# Patient Record
Sex: FEMALE | Race: WHITE | NOT HISPANIC OR LATINO
[De-identification: names, ages, dates, MRNs, and addresses within clinical notes are randomized per-mention and may not be internally consistent; named-entity substitution may affect disease eponyms.]

---

## 2017-02-11 ENCOUNTER — APPOINTMENT (OUTPATIENT)
Dept: OBGYN | Facility: CLINIC | Age: 27
End: 2017-02-11

## 2017-02-11 VITALS
HEIGHT: 67 IN | SYSTOLIC BLOOD PRESSURE: 108 MMHG | BODY MASS INDEX: 24.17 KG/M2 | DIASTOLIC BLOOD PRESSURE: 68 MMHG | WEIGHT: 154 LBS

## 2017-02-11 DIAGNOSIS — N94.6 DYSMENORRHEA, UNSPECIFIED: ICD-10-CM

## 2017-02-11 DIAGNOSIS — N92.6 IRREGULAR MENSTRUATION, UNSPECIFIED: ICD-10-CM

## 2017-02-11 DIAGNOSIS — Z01.419 ENCOUNTER FOR GYNECOLOGICAL EXAMINATION (GENERAL) (ROUTINE) W/OUT ABNORMAL FINDINGS: ICD-10-CM

## 2017-02-13 LAB
C TRACH DNA SPEC QL NAA+PROBE: NORMAL
C TRACH RRNA SPEC QL NAA+PROBE: NORMAL
N GONORRHOEA DNA SPEC QL NAA+PROBE: NORMAL
N GONORRHOEA RRNA SPEC QL NAA+PROBE: NORMAL
SOURCE AMPLIFICATION: NORMAL
SOURCE TP AMPLIFICATION: NORMAL
T VAGINALIS RRNA SPEC QL NAA+PROBE: NEGATIVE

## 2017-02-22 LAB — CYTOLOGY CVX/VAG DOC THIN PREP: NORMAL

## 2017-05-22 ENCOUNTER — RX RENEWAL (OUTPATIENT)
Age: 27
End: 2017-05-22

## 2017-08-07 ENCOUNTER — MEDICATION RENEWAL (OUTPATIENT)
Age: 27
End: 2017-08-07

## 2017-08-07 ENCOUNTER — RX RENEWAL (OUTPATIENT)
Age: 27
End: 2017-08-07

## 2017-09-01 ENCOUNTER — APPOINTMENT (OUTPATIENT)
Dept: OBGYN | Facility: CLINIC | Age: 27
End: 2017-09-01

## 2018-01-03 ENCOUNTER — LABORATORY RESULT (OUTPATIENT)
Age: 28
End: 2018-01-03

## 2018-01-03 ENCOUNTER — APPOINTMENT (OUTPATIENT)
Dept: RHEUMATOLOGY | Facility: CLINIC | Age: 28
End: 2018-01-03
Payer: COMMERCIAL

## 2018-01-03 VITALS
HEART RATE: 64 BPM | DIASTOLIC BLOOD PRESSURE: 54 MMHG | OXYGEN SATURATION: 98 % | SYSTOLIC BLOOD PRESSURE: 80 MMHG | HEIGHT: 67 IN

## 2018-01-03 DIAGNOSIS — R20.2 PARESTHESIA OF SKIN: ICD-10-CM

## 2018-01-03 PROCEDURE — 99204 OFFICE O/P NEW MOD 45 MIN: CPT

## 2018-01-03 RX ORDER — GABAPENTIN 300 MG/1
300 CAPSULE ORAL 3 TIMES DAILY
Refills: 0 | Status: ACTIVE | COMMUNITY
Start: 2018-01-03

## 2018-01-03 RX ORDER — TIZANIDINE 2 MG/1
2 TABLET ORAL
Qty: 60 | Refills: 3 | Status: ACTIVE | COMMUNITY
Start: 2018-01-03 | End: 1900-01-01

## 2018-01-08 LAB
25(OH)D3 SERPL-MCNC: 49 NG/ML
ALBUMIN SERPL ELPH-MCNC: 4.8 G/DL
ALP BLD-CCNC: 44 U/L
ALT SERPL-CCNC: 12 U/L
ANA SER IF-ACNC: NEGATIVE
ANION GAP SERPL CALC-SCNC: 17 MMOL/L
AST SERPL-CCNC: 24 U/L
BASOPHILS # BLD AUTO: 0.02 K/UL
BASOPHILS NFR BLD AUTO: 0.3 %
BILIRUB SERPL-MCNC: 0.5 MG/DL
BUN SERPL-MCNC: 17 MG/DL
C3 SERPL-MCNC: 121 MG/DL
C4 SERPL-MCNC: 24 MG/DL
CALCIUM SERPL-MCNC: 9.8 MG/DL
CARDIOLIPIN IGM SER-MCNC: <5 GPL
CARDIOLIPIN IGM SER-MCNC: <5 MPL
CCP AB SER IA-ACNC: <8 UNITS
CHLORIDE SERPL-SCNC: 101 MMOL/L
CK SERPL-CCNC: 119 U/L
CO2 SERPL-SCNC: 23 MMOL/L
CREAT SERPL-MCNC: 0.82 MG/DL
CREAT SPEC-SCNC: 16 MG/DL
CREAT/PROT UR: NORMAL
CRP SERPL-MCNC: 0.4 MG/DL
DSDNA AB SER-ACNC: <12 IU/ML
ENA RNP AB SER IA-ACNC: <0.2 AL
ENA SM AB SER IA-ACNC: <0.2 AL
ENA SS-A AB SER IA-ACNC: <0.2 AL
ENA SS-B AB SER IA-ACNC: <0.2 AL
ENDOMYSIUM IGA SER QL: NEGATIVE
ENDOMYSIUM IGA TITR SER: NORMAL
EOSINOPHIL # BLD AUTO: 0.03 K/UL
EOSINOPHIL NFR BLD AUTO: 0.5
ERYTHROCYTE [SEDIMENTATION RATE] IN BLOOD BY WESTERGREN METHOD: 13 MM/HR
GLIADIN IGA SER QL: 5.3 UNITS
GLIADIN IGG SER QL: <5 UNITS
GLIADIN PEPTIDE IGA SER-ACNC: NEGATIVE
GLIADIN PEPTIDE IGG SER-ACNC: NEGATIVE
GLUCOSE SERPL-MCNC: 109 MG/DL
HAV IGM SER QL: NONREACTIVE
HBV CORE IGM SER QL: NONREACTIVE
HBV SURFACE AG SER QL: NONREACTIVE
HCT VFR BLD CALC: 41.6 %
HCV AB SER QL: NONREACTIVE
HCV S/CO RATIO: 0.16 S/CO
HGB BLD-MCNC: 13.5 G/DL
IMM GRANULOCYTES NFR BLD AUTO: 0.3 %
LYMPHOCYTES # BLD AUTO: 1.72 K/UL
LYMPHOCYTES NFR BLD AUTO: 29.3 %
MAN DIFF?: NORMAL
MCHC RBC-ENTMCNC: 30.3 PG
MCHC RBC-ENTMCNC: 32.5 GM/DL
MCV RBC AUTO: 93.3 FL
MONOCYTES # BLD AUTO: 0.35 K/UL
MONOCYTES NFR BLD AUTO: 6 %
MPO AB + PR3 PNL SER: NORMAL
NEUTROPHILS # BLD AUTO: 3.74 K/UL
NEUTROPHILS NFR BLD AUTO: 63.6 %
PLATELET # BLD AUTO: 200 K/UL
POTASSIUM SERPL-SCNC: 4.2 MMOL/L
PROT SERPL-MCNC: 7.8 G/DL
PROT UR-MCNC: <4 MG/DL
RBC # BLD: 4.46 M/UL
RBC # FLD: 12.9 %
RF+CCP IGG SER-IMP: NEGATIVE
RHEUMATOID FACT SER QL: 9 IU/ML
SODIUM SERPL-SCNC: 141 MMOL/L
THYROGLOB AB SERPL-ACNC: <20 IU/ML
THYROPEROXIDASE AB SERPL IA-ACNC: 15.9 IU/ML
TSH SERPL-ACNC: 1.13 UIU/ML
TTG IGA SER IA-ACNC: <5 UNITS
TTG IGA SER-ACNC: NEGATIVE
TTG IGG SER IA-ACNC: <5 UNITS
TTG IGG SER IA-ACNC: NEGATIVE
WBC # FLD AUTO: 5.88 K/UL

## 2018-01-09 LAB — HISTONE AB SER QL: 0.7 UNITS

## 2018-02-16 ENCOUNTER — EMERGENCY (EMERGENCY)
Facility: HOSPITAL | Age: 28
LOS: 0 days | Discharge: HOME | End: 2018-02-16
Attending: EMERGENCY MEDICINE | Admitting: EMERGENCY MEDICINE
Payer: COMMERCIAL

## 2018-02-16 VITALS
RESPIRATION RATE: 18 BRPM | HEIGHT: 66 IN | DIASTOLIC BLOOD PRESSURE: 76 MMHG | TEMPERATURE: 99 F | SYSTOLIC BLOOD PRESSURE: 118 MMHG | WEIGHT: 160.06 LBS | OXYGEN SATURATION: 100 % | HEART RATE: 76 BPM

## 2018-02-16 DIAGNOSIS — Z87.312 PERSONAL HISTORY OF (HEALED) STRESS FRACTURE: Chronic | ICD-10-CM

## 2018-02-16 DIAGNOSIS — R11.0 NAUSEA: ICD-10-CM

## 2018-02-16 DIAGNOSIS — J11.1 INFLUENZA DUE TO UNIDENTIFIED INFLUENZA VIRUS WITH OTHER RESPIRATORY MANIFESTATIONS: ICD-10-CM

## 2018-02-16 PROCEDURE — 71046 X-RAY EXAM CHEST 2 VIEWS: CPT | Mod: 26

## 2018-02-16 PROCEDURE — 93010 ELECTROCARDIOGRAM REPORT: CPT

## 2018-02-16 PROCEDURE — 99283 EMERGENCY DEPT VISIT LOW MDM: CPT

## 2018-02-16 RX ORDER — ONDANSETRON 8 MG/1
4 TABLET, FILM COATED ORAL ONCE
Qty: 0 | Refills: 0 | Status: COMPLETED | OUTPATIENT
Start: 2018-02-16 | End: 2018-02-16

## 2018-02-16 RX ORDER — ONDANSETRON 8 MG/1
1 TABLET, FILM COATED ORAL
Qty: 12 | Refills: 0 | OUTPATIENT
Start: 2018-02-16

## 2018-02-16 RX ADMIN — ONDANSETRON 4 MILLIGRAM(S): 8 TABLET, FILM COATED ORAL at 14:33

## 2018-02-16 NOTE — ED STATDOCS - PROGRESS NOTE DETAILS
Patient seen and evaluated.  CXR with NAD.  Patient reporting nausea s/p tamiflu.  Will give zofran.  REviewed symptom care, PMD f/u and return precautions -Yimi Unger PA-C

## 2018-02-16 NOTE — ED STATDOCS - ATTENDING CONTRIBUTION TO CARE
I, Demian Smith MD,  performed the initial face to face bedside interview with this patient regarding history of present illness, review of symptoms and relevant past medical, social and family history.  I completed an independent physical examination.  I was the initial provider who evaluated this patient. I have signed out the follow up of any pending tests (i.e. labs, radiological studies) to the ACP.  I have communicated the patient’s plan of care and disposition with the ACP.  The history, relevant review of systems, past medical and surgical history, medical decision making, and physical examination was documented by the scribe in my presence and I attest to the accuracy of the documentation.  I, Demian Smith MD, personally saw the patient with ACP.  I have personally performed a face to face diagnostic evaluation on this patient.  I have reviewed the ACP note and agree with the history, exam, and plan of care, except as noted.

## 2018-02-16 NOTE — ED ADULT NURSE NOTE - CHPI ED SYMPTOMS NEG
no diarrhea/no chills/no fever/no hematuria/no blood in stool/no burning urination/no dysuria/no abdominal distension

## 2018-02-16 NOTE — ED ADULT NURSE NOTE - CHIEF COMPLAINT QUOTE
VACCINE ADMINISTRATION RECORD  PARENT / GUARDIAN APPROVAL  Date: 2021  Vaccine administered to:  Keo Fernandez     : 3/19/2018    MRN: [id number]    A copy of the appropriate Centers for Disease Control and Prevention Vaccine Information statemen pt presents with N/V and chills times three days. Pt has existing stress fx in right leg

## 2018-02-16 NOTE — ED ADULT NURSE NOTE - OBJECTIVE STATEMENT
pt brought by parent for eval of nausea and URI for the past 3 days. states taken Tamiflu w/o relief in sx.

## 2018-02-16 NOTE — ED STATDOCS - OBJECTIVE STATEMENT
28F, presents to ED c/o flu-like symptoms (N/V, myalgias, chills, cough) for 3 days. +abd pain. States going to Urgent Care center and being given Tamiflu. Pt has no other complaints and denies CP, and HA. Pt has existing stress fx in right leg (pt is in wheelchair).

## 2018-02-21 ENCOUNTER — APPOINTMENT (OUTPATIENT)
Dept: INTERNAL MEDICINE | Facility: CLINIC | Age: 28
End: 2018-02-21
Payer: COMMERCIAL

## 2018-02-21 VITALS
BODY MASS INDEX: 25.9 KG/M2 | OXYGEN SATURATION: 98 % | DIASTOLIC BLOOD PRESSURE: 80 MMHG | WEIGHT: 165 LBS | TEMPERATURE: 97.4 F | HEART RATE: 88 BPM | HEIGHT: 67 IN | SYSTOLIC BLOOD PRESSURE: 122 MMHG | RESPIRATION RATE: 18 BRPM

## 2018-02-21 DIAGNOSIS — M84.359A STRESS FRACTURE, HIP, UNSPECIFIED, INITIAL ENCOUNTER FOR FRACTURE: ICD-10-CM

## 2018-02-21 DIAGNOSIS — Z87.09 PERSONAL HISTORY OF OTHER DISEASES OF THE RESPIRATORY SYSTEM: ICD-10-CM

## 2018-02-21 PROCEDURE — 99213 OFFICE O/P EST LOW 20 MIN: CPT

## 2020-12-16 PROBLEM — Z87.09 HISTORY OF INFLUENZA: Status: RESOLVED | Noted: 2018-02-21 | Resolved: 2020-12-16

## 2021-04-28 ENCOUNTER — NON-APPOINTMENT (OUTPATIENT)
Age: 31
End: 2021-04-28

## 2021-04-28 ENCOUNTER — APPOINTMENT (OUTPATIENT)
Dept: RHEUMATOLOGY | Facility: CLINIC | Age: 31
End: 2021-04-28
Payer: COMMERCIAL

## 2021-04-28 VITALS
TEMPERATURE: 97.7 F | HEIGHT: 65 IN | HEART RATE: 67 BPM | SYSTOLIC BLOOD PRESSURE: 117 MMHG | WEIGHT: 167 LBS | OXYGEN SATURATION: 97 % | BODY MASS INDEX: 27.82 KG/M2 | DIASTOLIC BLOOD PRESSURE: 77 MMHG

## 2021-04-28 DIAGNOSIS — M54.5 LOW BACK PAIN: ICD-10-CM

## 2021-04-28 DIAGNOSIS — G62.9 POLYNEUROPATHY, UNSPECIFIED: ICD-10-CM

## 2021-04-28 DIAGNOSIS — R07.89 OTHER CHEST PAIN: ICD-10-CM

## 2021-04-28 DIAGNOSIS — M79.18 MYALGIA, OTHER SITE: ICD-10-CM

## 2021-04-28 DIAGNOSIS — R20.0 ANESTHESIA OF SKIN: ICD-10-CM

## 2021-04-28 DIAGNOSIS — Z82.61 FAMILY HISTORY OF ARTHRITIS: ICD-10-CM

## 2021-04-28 DIAGNOSIS — M25.50 PAIN IN UNSPECIFIED JOINT: ICD-10-CM

## 2021-04-28 DIAGNOSIS — R79.89 OTHER SPECIFIED ABNORMAL FINDINGS OF BLOOD CHEMISTRY: ICD-10-CM

## 2021-04-28 DIAGNOSIS — R20.2 ANESTHESIA OF SKIN: ICD-10-CM

## 2021-04-28 PROCEDURE — 99072 ADDL SUPL MATRL&STAF TM PHE: CPT

## 2021-04-28 PROCEDURE — 99205 OFFICE O/P NEW HI 60 MIN: CPT

## 2021-04-28 RX ORDER — MELOXICAM 15 MG/1
TABLET ORAL
Refills: 0 | Status: ACTIVE | COMMUNITY

## 2021-04-28 RX ORDER — GABAPENTIN 100 MG
100 TABLET ORAL
Refills: 0 | Status: ACTIVE | COMMUNITY

## 2021-04-28 RX ORDER — TIZANIDINE 4 MG/1
4 TABLET ORAL
Refills: 0 | Status: ACTIVE | COMMUNITY

## 2021-04-28 RX ORDER — NORTRIPTYLINE HYDROCHLORIDE 25 MG/1
25 CAPSULE ORAL
Refills: 0 | Status: ACTIVE | COMMUNITY

## 2021-04-28 NOTE — HISTORY OF PRESENT ILLNESS
[FreeTextEntry1] : 31-year-old female here for the first time. Patient states she was found to have a mildly high ACE= 91 with her physician and told to rule out sarcoid.\par Patient denies any swelling or redness or warmth of any joints. She denies any pain in the ankles. Denies any SOB or cough at this time. \par She denies any erythema nodosum nodules. Denies any skin lesion or rashes.\par She states she has history of costochondritis as well as myofascial pain syndrome that she monitors with pain management for many years. States she does take gabapentin t.i.d.\par States she has history of numbness/tingling intermittently. States she was diagnosed with small fiber neuropathy. Denies any dry eyes or dry mouth.\par States she notices some generalized tender points for fibromyalgia.\par States tizanidine p.r.n. helps but makes her drowsy so takes it intermittently. States she is on medical marijuana with pain management. Denies any illicit drug use.\par States she notices some lower back pain with standing; better with stretching. Denies any loss of bladder or bowel incontinence or saddle anesthesias.\par Denies any fever/chills, no rashes other than eczema at times, no ulcers, no dry eyes, no dry mouth, no raynaud's, no infectious diarrhea or  symptoms at this time.\par

## 2021-04-28 NOTE — PHYSICAL EXAM
[General Appearance - Alert] : alert [General Appearance - In No Acute Distress] : in no acute distress [Sclera] : the sclera and conjunctiva were normal [Extraocular Movements] : extraocular movements were intact [Outer Ear] : the ears and nose were normal in appearance [Neck Appearance] : the appearance of the neck was normal [Respiration, Rhythm And Depth] : normal respiratory rhythm and effort [Heart Rate And Rhythm] : heart rate was normal and rhythm regular [Heart Sounds] : normal S1 and S2 [Abdomen Soft] : soft [Abdomen Tenderness] : non-tender [Cervical Lymph Nodes Enlarged Anterior Bilaterally] : anterior cervical [Supraclavicular Lymph Nodes Enlarged Bilaterally] : supraclavicular [No CVA Tenderness] : no ~M costovertebral angle tenderness [Motor Tone] : muscle strength and tone were normal [] : no rash [No Focal Deficits] : no focal deficits [Impaired Insight] : insight and judgment were intact [Mood] : the mood was normal [FreeTextEntry1] : no synovitis or effusion on exam noted today; + multiple tender points incld sternum; costochondral; upper/lower back;elbows;hips; medial knee,etc

## 2021-04-28 NOTE — ASSESSMENT
[FreeTextEntry1] : 31-year-old female, here for the first time w/ me w/ generalized aches/pain w/ + tender points of Fibromyaglia incld costochondral pain on palpation & LBP to rule out inflammatory arthropathy incld seronegative spondyloarthropathy. \par Reports was told to r/o sarcoid by another physician for mildly high ACE= 91 on labs 12/2020. She denies any joint pain or swelling with no ankle pain at all at this time; no erythema nodosum nodules at this time; no SOB or cough. Knows to see Optho for eye exam to confirm normal. \par -reviewed labs 12/9/2020 from Dr. Cha w/ raised ACE= 91 (Nl up to 82); RO/LA neg; celiac pane neg\par -chart reviewed w/ labs 1/3/2018 w/ Rheum, Dr. Benitez all normal incld JERRELL w/ IF neg; ANCA neg; RF/CCP neg; RO/LA neg; ESR/CRP NL, etc\par -No synovitis or effusion on exam noted today and advised to monitor.\par -labs as below incld ESR, CRP, serologies to be complete\par -referred for chest xray to confirm no hilar adenopathy of sarcoid \par \par LBP: intermittent \par -Referred for xray of LS spine to evaluate for structural changes, DDD, confirm SI normal \par -Meloxicam PRN w/ food needed sparingly helps\par \par Cervicalgia: reviewed MRI c-spine 12/31/2020 w/ C5-C6 disc herniation \par \par Intermittent numbness/tingling: intermittent in hands Advised to monitor for CTS symptoms in the hands.\par -check metabolic labs incld folate, B12, TSH, HbA1C for it.\par -did EMG she reports and hx of small fiber neuropathy so will check Sjogren abs; denies sicca symptoms \par \par Fibromyalgia: + tender points present \par -continue gabapentin 300mg PO TID \par -encouraged gentle exercises as tolerated\par \par Myofascial pain syndrome: sees pain management, Dr. Viviana Fernandez \par -trigger point injections did not help\par -takes gabapentin, neurtriptyline, tizanidine pRN; medical marijuana and meloxicam PRN \par \par -educated on symptoms to monitor for in detail and alert us if any concerns.\par -knows to stay up to date on health maintenance w/ PCP\par -f/u in 10-14days w/ labs, xrays please\par

## 2021-05-10 LAB
25(OH)D3 SERPL-MCNC: 47.4 NG/ML
ACE BLD-CCNC: 78 U/L
ALBUMIN SERPL ELPH-MCNC: 4.8 G/DL
ALP BLD-CCNC: 46 U/L
ALT SERPL-CCNC: 14 U/L
ANION GAP SERPL CALC-SCNC: 14 MMOL/L
AST SERPL-CCNC: 24 U/L
BASOPHILS # BLD AUTO: 0.04 K/UL
BASOPHILS NFR BLD AUTO: 1 %
BILIRUB SERPL-MCNC: 0.4 MG/DL
BUN SERPL-MCNC: 12 MG/DL
CALCIUM SERPL-MCNC: 9.5 MG/DL
CHLORIDE SERPL-SCNC: 105 MMOL/L
CK SERPL-CCNC: 151 U/L
CO2 SERPL-SCNC: 22 MMOL/L
CREAT SERPL-MCNC: 0.91 MG/DL
CRP SERPL-MCNC: 3 MG/L
EOSINOPHIL # BLD AUTO: 0.1 K/UL
EOSINOPHIL NFR BLD AUTO: 2.5 %
ERYTHROCYTE [SEDIMENTATION RATE] IN BLOOD BY WESTERGREN METHOD: 8 MM/HR
ESTIMATED AVERAGE GLUCOSE: 94 MG/DL
FOLATE SERPL-MCNC: 16.9 NG/ML
GLUCOSE SERPL-MCNC: 76 MG/DL
HBA1C MFR BLD HPLC: 4.9 %
HCT VFR BLD CALC: 42.2 %
HGB BLD-MCNC: 13.6 G/DL
IMM GRANULOCYTES NFR BLD AUTO: 0.2 %
LYMPHOCYTES # BLD AUTO: 2.04 K/UL
LYMPHOCYTES NFR BLD AUTO: 50.1 %
MAN DIFF?: NORMAL
MCHC RBC-ENTMCNC: 30.6 PG
MCHC RBC-ENTMCNC: 32.2 GM/DL
MCV RBC AUTO: 94.8 FL
MONOCYTES # BLD AUTO: 0.36 K/UL
MONOCYTES NFR BLD AUTO: 8.8 %
NEUTROPHILS # BLD AUTO: 1.52 K/UL
NEUTROPHILS NFR BLD AUTO: 37.4 %
PLATELET # BLD AUTO: 186 K/UL
POTASSIUM SERPL-SCNC: 4.9 MMOL/L
PROT SERPL-MCNC: 7.3 G/DL
RBC # BLD: 4.45 M/UL
RBC # FLD: 12.9 %
SODIUM SERPL-SCNC: 140 MMOL/L
TSH SERPL-ACNC: 2.32 UIU/ML
VIT B12 SERPL-MCNC: 344 PG/ML
WBC # FLD AUTO: 4.07 K/UL

## 2021-05-12 LAB
B BURGDOR AB SER-IMP: NEGATIVE
B BURGDOR IGM PATRN SER IB-IMP: NEGATIVE
B BURGDOR18KD IGG SER QL IB: NORMAL
B BURGDOR23KD IGG SER QL IB: NORMAL
B BURGDOR23KD IGM SER QL IB: NORMAL
B BURGDOR28KD IGG SER QL IB: NORMAL
B BURGDOR30KD IGG SER QL IB: NORMAL
B BURGDOR31KD IGG SER QL IB: NORMAL
B BURGDOR39KD IGG SER QL IB: NORMAL
B BURGDOR39KD IGM SER QL IB: NORMAL
B BURGDOR41KD IGG SER QL IB: PRESENT
B BURGDOR41KD IGM SER QL IB: NORMAL
B BURGDOR45KD IGG SER QL IB: NORMAL
B BURGDOR58KD IGG SER QL IB: NORMAL
B BURGDOR66KD IGG SER QL IB: NORMAL
B BURGDOR93KD IGG SER QL IB: NORMAL
ENA SS-A AB SER IA-ACNC: <0.2 AL
ENA SS-B AB SER IA-ACNC: <0.2 AL
G6PD SER-CCNC: 14.8 U/G HGB
HLA-B27 RELATED AG QL: NEGATIVE

## 2021-05-17 LAB
B19V IGG SER QL IA: 9.43 INDEX
B19V IGG+IGM SER-IMP: NORMAL
B19V IGG+IGM SER-IMP: POSITIVE
B19V IGM FLD-ACNC: 0.17 INDEX
B19V IGM SER-ACNC: NEGATIVE

## 2021-06-09 ENCOUNTER — APPOINTMENT (OUTPATIENT)
Dept: RHEUMATOLOGY | Facility: CLINIC | Age: 31
End: 2021-06-09
Payer: COMMERCIAL

## 2021-06-09 VITALS
WEIGHT: 160 LBS | DIASTOLIC BLOOD PRESSURE: 72 MMHG | BODY MASS INDEX: 26.63 KG/M2 | HEART RATE: 68 BPM | SYSTOLIC BLOOD PRESSURE: 128 MMHG | OXYGEN SATURATION: 98 % | TEMPERATURE: 97.1 F

## 2021-06-09 DIAGNOSIS — M50.20 OTHER CERVICAL DISC DISPLACEMENT, UNSPECIFIED CERVICAL REGION: ICD-10-CM

## 2021-06-09 DIAGNOSIS — E53.8 DEFICIENCY OF OTHER SPECIFIED B GROUP VITAMINS: ICD-10-CM

## 2021-06-09 DIAGNOSIS — M79.7 FIBROMYALGIA: ICD-10-CM

## 2021-06-09 PROCEDURE — 99214 OFFICE O/P EST MOD 30 MIN: CPT

## 2021-06-09 RX ORDER — CYANOCOBALAMIN (VITAMIN B-12) 2500 MCG
2500 TABLET, SUBLINGUAL SUBLINGUAL
Qty: 30 | Refills: 1 | Status: ACTIVE | COMMUNITY
Start: 2021-06-09 | End: 1900-01-01

## 2021-06-09 NOTE — REASON FOR VISIT
[Follow-Up: _____] : a [unfilled] follow-up visit [FreeTextEntry1] : Fibromyalgia; hx of raised ACE; review labs/xrays/med

## 2021-06-09 NOTE — ASSESSMENT
[FreeTextEntry1] : 31-year-old female, here for the first time w/ me w/ generalized aches/pain w/ + tender points of Fibromyaglia incld costochondral pain on palpation & LBP to rule out inflammatory arthropathy incld seronegative spondyloarthropathy. \par Reports was told to r/o sarcoid by another physician for mildly high ACE= 91 on labs 12/2020 w/ repeat ACE normal here and chest xray normal without hilar adenopathy. She denies any joint pain or swelling with no ankle pain at all at this time; no erythema nodosum nodules at this time; no SOB or cough. Reports eye exam w/ Optho normal within the yr and monitors there.\par -reviewed labs 4/28/2021 w/ normal ESR/CRP; ACE normal; low B12= 344; Nl CPK; etc\par -reviewed chest xray 5/10/2021 normal.\par -reviewed labs 12/9/2020 from Dr. Cha w/ raised ACE= 91 (Nl up to 82); RO/LA neg; celiac pane neg\par -chart reviewed w/ labs 1/3/2018 w/ Rheum, Dr. Benitez all normal incld JERRELL w/ IF neg; ANCA neg; RF/CCP neg; RO/LA neg; ESR/CRP NL, etc\par -No synovitis or effusion on exam noted today and advised to monitor.\par \par LBP: intermittent; HLA-B27 negative \par -Reviewed xray LS spine 5/10/2021 normal; SI normal \par -Meloxicam PRN w/ food needed sparingly helps\par \par Cervicalgia: reviewed MRI c-spine 12/31/2020 w/ C5-C6 disc herniation \par \par Intermittent numbness/tingling: intermittent \par -reviewed metabolic labs 4/28/2021 w/ normal folate,  low B12 <400, NL TSH, Nl HbA1C for it.\par -did EMG she reports and hx of small fiber neuropathy so will check Sjogren abs; denies sicca symptoms \par \par Low B12 <400 on labs\par -reports does not eat much red meat\par -discussed r/b/s of B12 2500mcg PO daily w/ pt agreeable and prescription sent as below; discussed to monitor levels w/ PCP and if still low she can consider IM injections w/ her PCP\par \par Fibromyalgia: + tender points present \par -continue gabapentin 300mg PO TID w/ pain management \par -encouraged gentle exercises as tolerated\par -PT referral given for medical massage if covered by insurance \par \par Myofascial pain syndrome: sees pain management, Dr. Viviana Fernandez \par -trigger point injections did not help\par -takes gabapentin, neurtriptyline, tizanidine pRN; medical marijuana and meloxicam PRN \par \par -educated on symptoms to monitor for in detail and alert us if any concerns.\par -knows to stay up to date on health maintenance w/ PCP\par -f/u in 1 yr PRN or sooner as needed \par . \par

## 2021-06-09 NOTE — PHYSICAL EXAM
[General Appearance - Alert] : alert [General Appearance - In No Acute Distress] : in no acute distress [Sclera] : the sclera and conjunctiva were normal [Extraocular Movements] : extraocular movements were intact [Outer Ear] : the ears and nose were normal in appearance [Neck Appearance] : the appearance of the neck was normal [Respiration, Rhythm And Depth] : normal respiratory rhythm and effort [Heart Rate And Rhythm] : heart rate was normal and rhythm regular [Heart Sounds] : normal S1 and S2 [Abdomen Soft] : soft [Abdomen Tenderness] : non-tender [Cervical Lymph Nodes Enlarged Anterior Bilaterally] : anterior cervical [Supraclavicular Lymph Nodes Enlarged Bilaterally] : supraclavicular [No CVA Tenderness] : no ~M costovertebral angle tenderness [Motor Tone] : muscle strength and tone were normal [] : no rash [No Focal Deficits] : no focal deficits [Impaired Insight] : insight and judgment were intact [Mood] : the mood was normal [FreeTextEntry1] : no synovitis or effusion on exam noted today; + multiple tender points incld sternum; upper/lower back;elbows;hips; medial knee,etc

## 2022-08-01 ENCOUNTER — OFFICE (OUTPATIENT)
Dept: URBAN - METROPOLITAN AREA CLINIC 102 | Facility: CLINIC | Age: 32
Setting detail: OPHTHALMOLOGY
End: 2022-08-01
Payer: COMMERCIAL

## 2022-08-01 DIAGNOSIS — H16.223: ICD-10-CM

## 2022-08-01 PROCEDURE — 92004 COMPRE OPH EXAM NEW PT 1/>: CPT | Performed by: OPHTHALMOLOGY

## 2022-08-01 ASSESSMENT — VISUAL ACUITY
OS_BCVA: 20/20
OD_BCVA: 20/20

## 2022-08-01 ASSESSMENT — REFRACTION_AUTOREFRACTION
OD_SPHERE: -1.25
OS_SPHERE: +0.50
OS_CYLINDER: -0.25
OD_CYLINDER: -0.50
OS_AXIS: 078
OD_AXIS: 112

## 2022-08-01 ASSESSMENT — KERATOMETRY
OD_K2POWER_DIOPTERS: 47.50
OS_K1POWER_DIOPTERS: 46.75
OS_AXISANGLE_DEGREES: 084
OD_K1POWER_DIOPTERS: 47.00
OS_K2POWER_DIOPTERS: 47.00
OD_AXISANGLE_DEGREES: 060

## 2022-08-01 ASSESSMENT — AXIALLENGTH_DERIVED
OD_AL: 22.8247
OS_AL: 22.283

## 2022-08-01 ASSESSMENT — SPHEQUIV_DERIVED
OD_SPHEQUIV: -1.5
OS_SPHEQUIV: 0.375

## 2022-08-01 ASSESSMENT — TONOMETRY
OS_IOP_MMHG: 14
OD_IOP_MMHG: 15

## 2022-08-01 ASSESSMENT — SUPERFICIAL PUNCTATE KERATITIS (SPK)
OD_SPK: T
OS_SPK: T

## 2022-08-01 ASSESSMENT — CONFRONTATIONAL VISUAL FIELD TEST (CVF)
OD_FINDINGS: FULL
OS_FINDINGS: FULL

## 2022-08-08 ENCOUNTER — OFFICE (OUTPATIENT)
Dept: URBAN - METROPOLITAN AREA CLINIC 102 | Facility: CLINIC | Age: 32
Setting detail: OPHTHALMOLOGY
End: 2022-08-08
Payer: COMMERCIAL

## 2022-08-08 DIAGNOSIS — H10.45: ICD-10-CM

## 2022-08-08 PROBLEM — H16.223 DRY EYE SYNDROME K SICCA; BOTH EYES: Status: ACTIVE | Noted: 2022-08-01

## 2022-08-08 PROCEDURE — 92012 INTRM OPH EXAM EST PATIENT: CPT | Performed by: OPHTHALMOLOGY

## 2022-08-08 ASSESSMENT — REFRACTION_AUTOREFRACTION
OS_SPHERE: +0.50
OD_AXIS: 106
OD_CYLINDER: -0.75
OS_CYLINDER: -0.50
OS_AXIS: 107
OD_SPHERE: -0.75

## 2022-08-08 ASSESSMENT — KERATOMETRY
OD_K2POWER_DIOPTERS: 47.00
OD_K1POWER_DIOPTERS: 47.00
OS_K2POWER_DIOPTERS: 46.50
OD_AXISANGLE_DEGREES: 90
OS_AXISANGLE_DEGREES: 90
OS_K1POWER_DIOPTERS: 46.50

## 2022-08-08 ASSESSMENT — TONOMETRY
OS_IOP_MMHG: 17
OD_IOP_MMHG: 15

## 2022-08-08 ASSESSMENT — CONFRONTATIONAL VISUAL FIELD TEST (CVF)
OD_FINDINGS: FULL
OS_FINDINGS: FULL

## 2022-08-08 ASSESSMENT — AXIALLENGTH_DERIVED
OD_AL: 22.774
OS_AL: 22.4506

## 2022-08-08 ASSESSMENT — SPHEQUIV_DERIVED
OD_SPHEQUIV: -1.125
OS_SPHEQUIV: 0.25

## 2022-08-08 ASSESSMENT — VISUAL ACUITY
OD_BCVA: 20/20
OS_BCVA: 20/20

## 2022-08-14 NOTE — HISTORY OF PRESENT ILLNESS
[FreeTextEntry1] : 31-year-old female here for first follow up w/ her mother, Nancy to review labs and xrays. Patient states she was found to have a mildly high ACE= 91 with her physician and told to rule out sarcoid. Her ACE on repeat labs is normal now w/ normal chest xray.\par Patient denies any swelling or redness or warmth of any joints. She denies any pain in the ankles. Denies any SOB or cough at this time. \par She denies any erythema nodosum nodules. Denies any skin lesion or rashes.\par She states she has history of costochondritis as well as myofascial pain syndrome that she monitors with pain management for many years. States she does take gabapentin t.i.d.\par States she has history of numbness/tingling intermittently. States she was diagnosed with small fiber neuropathy. Denies any dry eyes or dry mouth.\par States she notices some generalized tender points for fibromyalgia.\par States tizanidine p.r.n. helps but makes her drowsy so takes it intermittently. States she is on medical marijuana with pain management. Denies any illicit drug use.\par States she notices some lower back pain with standing; better with stretching. Denies any loss of bladder or bowel incontinence or saddle anesthesias.\par Denies any fever/chills, no rashes other than eczema at times, no ulcers, no dry eyes, no dry mouth, no raynaud's, no infectious diarrhea or  symptoms at this time.\par \par  24-Jan-2022

## 2022-09-03 ENCOUNTER — EMERGENCY (EMERGENCY)
Facility: HOSPITAL | Age: 32
LOS: 0 days | Discharge: HOME | End: 2022-09-03
Attending: EMERGENCY MEDICINE
Payer: COMMERCIAL

## 2022-09-03 VITALS
RESPIRATION RATE: 16 BRPM | HEIGHT: 66 IN | HEART RATE: 70 BPM | TEMPERATURE: 98 F | WEIGHT: 175.05 LBS | DIASTOLIC BLOOD PRESSURE: 90 MMHG | OXYGEN SATURATION: 98 % | SYSTOLIC BLOOD PRESSURE: 118 MMHG

## 2022-09-03 DIAGNOSIS — Z87.312 PERSONAL HISTORY OF (HEALED) STRESS FRACTURE: Chronic | ICD-10-CM

## 2022-09-03 DIAGNOSIS — R10.813 RIGHT LOWER QUADRANT ABDOMINAL TENDERNESS: ICD-10-CM

## 2022-09-03 DIAGNOSIS — R07.81 PLEURODYNIA: ICD-10-CM

## 2022-09-03 DIAGNOSIS — R10.816 EPIGASTRIC ABDOMINAL TENDERNESS: ICD-10-CM

## 2022-09-03 DIAGNOSIS — G89.29 OTHER CHRONIC PAIN: ICD-10-CM

## 2022-09-03 DIAGNOSIS — R10.9 UNSPECIFIED ABDOMINAL PAIN: ICD-10-CM

## 2022-09-03 DIAGNOSIS — R10.815 PERIUMBILIC ABDOMINAL TENDERNESS: ICD-10-CM

## 2022-09-03 DIAGNOSIS — R10.811 RIGHT UPPER QUADRANT ABDOMINAL TENDERNESS: ICD-10-CM

## 2022-09-03 LAB
ALBUMIN SERPL ELPH-MCNC: 4 G/DL — SIGNIFICANT CHANGE UP (ref 3.3–5)
ALP SERPL-CCNC: 43 U/L — SIGNIFICANT CHANGE UP (ref 40–120)
ALT FLD-CCNC: 23 U/L — SIGNIFICANT CHANGE UP (ref 12–78)
ANION GAP SERPL CALC-SCNC: 8 MMOL/L — SIGNIFICANT CHANGE UP (ref 5–17)
APPEARANCE UR: CLEAR — SIGNIFICANT CHANGE UP
APTT BLD: 31 SEC — SIGNIFICANT CHANGE UP (ref 27.5–35.5)
AST SERPL-CCNC: 23 U/L — SIGNIFICANT CHANGE UP (ref 15–37)
BASOPHILS # BLD AUTO: 0.05 K/UL — SIGNIFICANT CHANGE UP (ref 0–0.2)
BASOPHILS NFR BLD AUTO: 1 % — SIGNIFICANT CHANGE UP (ref 0–2)
BILIRUB SERPL-MCNC: 0.5 MG/DL — SIGNIFICANT CHANGE UP (ref 0.2–1.2)
BILIRUB UR-MCNC: NEGATIVE — SIGNIFICANT CHANGE UP
BUN SERPL-MCNC: 12 MG/DL — SIGNIFICANT CHANGE UP (ref 7–23)
CALCIUM SERPL-MCNC: 9.6 MG/DL — SIGNIFICANT CHANGE UP (ref 8.5–10.1)
CHLORIDE SERPL-SCNC: 106 MMOL/L — SIGNIFICANT CHANGE UP (ref 96–108)
CO2 SERPL-SCNC: 26 MMOL/L — SIGNIFICANT CHANGE UP (ref 22–31)
COLOR SPEC: YELLOW — SIGNIFICANT CHANGE UP
CREAT SERPL-MCNC: 0.98 MG/DL — SIGNIFICANT CHANGE UP (ref 0.5–1.3)
DIFF PNL FLD: NEGATIVE — SIGNIFICANT CHANGE UP
EGFR: 80 ML/MIN/1.73M2 — SIGNIFICANT CHANGE UP
EOSINOPHIL # BLD AUTO: 0.12 K/UL — SIGNIFICANT CHANGE UP (ref 0–0.5)
EOSINOPHIL NFR BLD AUTO: 2.4 % — SIGNIFICANT CHANGE UP (ref 0–6)
GLUCOSE SERPL-MCNC: 97 MG/DL — SIGNIFICANT CHANGE UP (ref 70–99)
GLUCOSE UR QL: NEGATIVE — SIGNIFICANT CHANGE UP
HCG SERPL-ACNC: <1 MIU/ML — SIGNIFICANT CHANGE UP
HCT VFR BLD CALC: 40.8 % — SIGNIFICANT CHANGE UP (ref 34.5–45)
HGB BLD-MCNC: 13.6 G/DL — SIGNIFICANT CHANGE UP (ref 11.5–15.5)
IMM GRANULOCYTES NFR BLD AUTO: 0 % — SIGNIFICANT CHANGE UP (ref 0–1.5)
INR BLD: 1.01 RATIO — SIGNIFICANT CHANGE UP (ref 0.88–1.16)
KETONES UR-MCNC: NEGATIVE — SIGNIFICANT CHANGE UP
LEUKOCYTE ESTERASE UR-ACNC: NEGATIVE — SIGNIFICANT CHANGE UP
LIDOCAIN IGE QN: 165 U/L — SIGNIFICANT CHANGE UP (ref 73–393)
LYMPHOCYTES # BLD AUTO: 2.14 K/UL — SIGNIFICANT CHANGE UP (ref 1–3.3)
LYMPHOCYTES # BLD AUTO: 42.3 % — SIGNIFICANT CHANGE UP (ref 13–44)
MCHC RBC-ENTMCNC: 30.7 PG — SIGNIFICANT CHANGE UP (ref 27–34)
MCHC RBC-ENTMCNC: 33.3 GM/DL — SIGNIFICANT CHANGE UP (ref 32–36)
MCV RBC AUTO: 92.1 FL — SIGNIFICANT CHANGE UP (ref 80–100)
MONOCYTES # BLD AUTO: 0.35 K/UL — SIGNIFICANT CHANGE UP (ref 0–0.9)
MONOCYTES NFR BLD AUTO: 6.9 % — SIGNIFICANT CHANGE UP (ref 2–14)
NEUTROPHILS # BLD AUTO: 2.4 K/UL — SIGNIFICANT CHANGE UP (ref 1.8–7.4)
NEUTROPHILS NFR BLD AUTO: 47.4 % — SIGNIFICANT CHANGE UP (ref 43–77)
NITRITE UR-MCNC: NEGATIVE — SIGNIFICANT CHANGE UP
PH UR: 5 — SIGNIFICANT CHANGE UP (ref 5–8)
PLATELET # BLD AUTO: 188 K/UL — SIGNIFICANT CHANGE UP (ref 150–400)
POTASSIUM SERPL-MCNC: 4.2 MMOL/L — SIGNIFICANT CHANGE UP (ref 3.5–5.3)
POTASSIUM SERPL-SCNC: 4.2 MMOL/L — SIGNIFICANT CHANGE UP (ref 3.5–5.3)
PROT SERPL-MCNC: 8.1 GM/DL — SIGNIFICANT CHANGE UP (ref 6–8.3)
PROT UR-MCNC: NEGATIVE — SIGNIFICANT CHANGE UP
PROTHROM AB SERPL-ACNC: 11.7 SEC — SIGNIFICANT CHANGE UP (ref 10.5–13.4)
RBC # BLD: 4.43 M/UL — SIGNIFICANT CHANGE UP (ref 3.8–5.2)
RBC # FLD: 12.2 % — SIGNIFICANT CHANGE UP (ref 10.3–14.5)
SODIUM SERPL-SCNC: 140 MMOL/L — SIGNIFICANT CHANGE UP (ref 135–145)
SP GR SPEC: 1.02 — SIGNIFICANT CHANGE UP (ref 1.01–1.02)
UROBILINOGEN FLD QL: NEGATIVE — SIGNIFICANT CHANGE UP
WBC # BLD: 5.06 K/UL — SIGNIFICANT CHANGE UP (ref 3.8–10.5)
WBC # FLD AUTO: 5.06 K/UL — SIGNIFICANT CHANGE UP (ref 3.8–10.5)

## 2022-09-03 PROCEDURE — 99285 EMERGENCY DEPT VISIT HI MDM: CPT

## 2022-09-03 PROCEDURE — 83690 ASSAY OF LIPASE: CPT

## 2022-09-03 PROCEDURE — 99284 EMERGENCY DEPT VISIT MOD MDM: CPT | Mod: 25

## 2022-09-03 PROCEDURE — 36415 COLL VENOUS BLD VENIPUNCTURE: CPT

## 2022-09-03 PROCEDURE — 74177 CT ABD & PELVIS W/CONTRAST: CPT | Mod: 26,MA

## 2022-09-03 PROCEDURE — 84702 CHORIONIC GONADOTROPIN TEST: CPT

## 2022-09-03 PROCEDURE — 81003 URINALYSIS AUTO W/O SCOPE: CPT

## 2022-09-03 PROCEDURE — 86900 BLOOD TYPING SEROLOGIC ABO: CPT

## 2022-09-03 PROCEDURE — 87086 URINE CULTURE/COLONY COUNT: CPT

## 2022-09-03 PROCEDURE — 85025 COMPLETE CBC W/AUTO DIFF WBC: CPT

## 2022-09-03 PROCEDURE — 86901 BLOOD TYPING SEROLOGIC RH(D): CPT

## 2022-09-03 PROCEDURE — 86850 RBC ANTIBODY SCREEN: CPT

## 2022-09-03 PROCEDURE — 85730 THROMBOPLASTIN TIME PARTIAL: CPT

## 2022-09-03 PROCEDURE — 80053 COMPREHEN METABOLIC PANEL: CPT

## 2022-09-03 PROCEDURE — 74177 CT ABD & PELVIS W/CONTRAST: CPT | Mod: MA

## 2022-09-03 PROCEDURE — 85610 PROTHROMBIN TIME: CPT

## 2022-09-03 NOTE — ED ADULT NURSE NOTE - OBJECTIVE STATEMENT
patient axox3, c/o pain from under right ribs radiating to right abdomen and back since thursday night. patient denies headache, vision changes, n/v/d, urinary symptoms, fever, chills, cough, chest pain, SOB. color good, skin warm and dry, respirations even and unlabored. patient able to speak in full sentences. patient able to ambulate independently with a steady gait while maintaining safety.

## 2022-09-03 NOTE — ED STATDOCS - OBJECTIVE STATEMENT
32 year old female with PMHx of chronic pain on Meloxicam, medical marijuana, Gabapentin presents to the ED c/o pain from under the rib radiating to right abdomen and back  x Thursday night. Ice improved pain slightly, heat made it worse. Denies urinary symptoms, prior occurrence, fever, chills, n/v/d, SOB, recent trauma. No other injuries or complaints.

## 2022-09-03 NOTE — ED STATDOCS - CLINICAL SUMMARY MEDICAL DECISION MAKING FREE TEXT BOX
Labs all unremarkable and unremarkable. CT.  Pt. appearing well.  Discussed with mother and patient and provided return instructions.  Eleonora Simental PA-C

## 2022-09-03 NOTE — ED STATDOCS - NS ED ATTENDING STATEMENT MOD
This was a shared visit with the PAMELA. I reviewed and verified the documentation and independently performed the documented:

## 2022-09-03 NOTE — ED STATDOCS - GASTROINTESTINAL, MLM
mild epigastric, RUQ, RLQ, periumbilical and LLQ TTP, soft, no rebound or guarding and non-distended. Bowel sounds present.

## 2022-09-03 NOTE — ED ADULT TRIAGE NOTE - CHIEF COMPLAINT QUOTE
patient sent from urgent care c/o abdominal pain .  patient reports pain started thursday night.  in RUQ, radiates to RLQ and across abdomen to LLQ.  denies N/V/D, fever/chills, urinary symptoms.  hx chronic pain condition.

## 2022-09-03 NOTE — ED STATDOCS - PATIENT PORTAL LINK FT
You can access the FollowMyHealth Patient Portal offered by [facility] by registering at the following website: [email or web address]. By joining [facility]’s FollowMyHealth portal, you will also be able to view your health information using other applications (apps) compatible with our system.

## 2022-09-03 NOTE — ED STATDOCS - CARE PROVIDER_API CALL
Memo Bright (MD)  Gastroenterology; Internal Medicine  [address], [place] [address]  Phone: [phone]  Fax: [phone]  Follow Up Time:

## 2022-09-03 NOTE — ED ADULT NURSE NOTE - CHIEF COMPLAINT QUOTE
Speaking Coherently
patient sent from urgent care c/o abdominal pain .  patient reports pain started thursday night.  in RUQ, radiates to RLQ and across abdomen to LLQ.  denies N/V/D, fever/chills, urinary symptoms.  hx chronic pain condition.

## 2022-09-03 NOTE — ED STATDOCS - PROGRESS NOTE DETAILS
Pt. is a 32 year old female Hx chronic pain on Melixicam, Medical marajuana, Gabapentin, presents with abdominal pain x2 days. 32 year old female with PMHx of chronic pain on Meloxicam, medical marijuana, Gabapentin presents to the ED c/o pain from under the rib radiating to right abdomen and back  x Thursday night. Ice improved pain slightly, heat made it worse. Denies urinary symptoms, prior occurrence, fever, chills, n/v/d, SOB, recent trauma. No other injuries or complaints. Labs all unremarkable and unremarkable. CT.  Pt. appearing well.  Discussed with mother and patient and provided return instructions.  Eleonora Simental PA-C Pt. is a 32 year old female Hx chronic pain on Meloxicam, Medical marijuana, Gabapentin, presents with abdominal pain x2 days.  Pain to RUQ, RLQ and LLQ.  Ice improved pain at home.  Neg. N/V/D, F/C/S, SOB, Eleonora Simental PA-C

## 2022-09-04 LAB
CULTURE RESULTS: SIGNIFICANT CHANGE UP
SPECIMEN SOURCE: SIGNIFICANT CHANGE UP

## 2022-09-26 ENCOUNTER — APPOINTMENT (OUTPATIENT)
Dept: OTOLARYNGOLOGY | Facility: CLINIC | Age: 32
End: 2022-09-26

## 2022-09-26 VITALS
HEART RATE: 84 BPM | HEIGHT: 66 IN | DIASTOLIC BLOOD PRESSURE: 85 MMHG | BODY MASS INDEX: 28.93 KG/M2 | SYSTOLIC BLOOD PRESSURE: 128 MMHG | WEIGHT: 180 LBS

## 2022-09-26 DIAGNOSIS — Z80.9 FAMILY HISTORY OF MALIGNANT NEOPLASM, UNSPECIFIED: ICD-10-CM

## 2022-09-26 DIAGNOSIS — T78.40XA ALLERGY, UNSPECIFIED, INITIAL ENCOUNTER: ICD-10-CM

## 2022-09-26 DIAGNOSIS — H93.8X3 OTHER SPECIFIED DISORDERS OF EAR, BILATERAL: ICD-10-CM

## 2022-09-26 DIAGNOSIS — Z78.9 OTHER SPECIFIED HEALTH STATUS: ICD-10-CM

## 2022-09-26 PROCEDURE — 92567 TYMPANOMETRY: CPT

## 2022-09-26 PROCEDURE — 31575 DIAGNOSTIC LARYNGOSCOPY: CPT

## 2022-09-26 PROCEDURE — 99203 OFFICE O/P NEW LOW 30 MIN: CPT | Mod: 25

## 2022-09-26 PROCEDURE — 92557 COMPREHENSIVE HEARING TEST: CPT

## 2022-09-26 RX ORDER — METHYLPREDNISOLONE 4 MG/1
4 TABLET ORAL
Qty: 1 | Refills: 1 | Status: ACTIVE | COMMUNITY
Start: 2022-09-26 | End: 1900-01-01

## 2022-09-26 RX ORDER — COVID-19 MOLECULAR TEST ASSAY
KIT MISCELLANEOUS
Qty: 1 | Refills: 0 | Status: ACTIVE | COMMUNITY
Start: 2022-05-31

## 2022-09-26 RX ORDER — DROSPIRENONE AND ETHINYL ESTRADIOL 0.02-3(28)
3-0.02 KIT ORAL
Qty: 84 | Refills: 0 | Status: ACTIVE | COMMUNITY
Start: 2022-08-13

## 2022-09-26 RX ORDER — CEPHALEXIN 500 MG/1
500 CAPSULE ORAL
Qty: 28 | Refills: 0 | Status: ACTIVE | COMMUNITY
Start: 2022-09-09

## 2022-09-26 RX ORDER — NEOMYCIN AND POLYMYXIN B SULFATES AND HYDROCORTISONE OTIC 10; 3.5; 1 MG/ML; MG/ML; [USP'U]/ML
3.5-10000-1 SUSPENSION AURICULAR (OTIC)
Qty: 10 | Refills: 0 | Status: ACTIVE | COMMUNITY
Start: 2022-09-01

## 2022-09-26 RX ORDER — ALPRAZOLAM 0.25 MG/1
0.25 TABLET ORAL
Qty: 9 | Refills: 0 | Status: ACTIVE | COMMUNITY
Start: 2022-09-10

## 2022-09-26 RX ORDER — NIRMATRELVIR AND RITONAVIR 300-100 MG
20 X 150 MG & KIT ORAL
Qty: 30 | Refills: 0 | Status: ACTIVE | COMMUNITY
Start: 2022-06-01

## 2022-09-26 RX ORDER — SULFAMETHOXAZOLE AND TRIMETHOPRIM 800; 160 MG/1; MG/1
800-160 TABLET ORAL
Qty: 14 | Refills: 0 | Status: ACTIVE | COMMUNITY
Start: 2022-09-08

## 2022-09-26 RX ORDER — TIZANIDINE HYDROCHLORIDE 2 MG/1
2 CAPSULE ORAL
Qty: 120 | Refills: 0 | Status: ACTIVE | COMMUNITY
Start: 2022-07-11

## 2022-09-26 RX ORDER — TOBRAMYCIN AND DEXAMETHASONE 3; 1 MG/ML; MG/ML
0.3-0.1 SUSPENSION/ DROPS OPHTHALMIC
Qty: 5 | Refills: 0 | Status: ACTIVE | COMMUNITY
Start: 2022-08-08

## 2022-09-26 NOTE — CONSULT LETTER
[Dear  ___] : Dear  [unfilled], [Consult Letter:] : I had the pleasure of evaluating your patient, [unfilled]. [Please see my note below.] : Please see my note below. [Consult Closing:] : Thank you very much for allowing me to participate in the care of this patient.  If you have any questions, please do not hesitate to contact me. [Sincerely,] : Sincerely, [FreeTextEntry3] : Eddi Saldana M.D.

## 2022-09-26 NOTE — REASON FOR VISIT
[Initial Consultation] : an initial consultation for [FreeTextEntry2] : Throat swelling, ear pressure after reaction to Bactrim

## 2022-09-26 NOTE — ASSESSMENT
[FreeTextEntry1] : Mary Nuñez presents for evaluation of bilateral aural fullness and globus sensation. She had bilateral aural fullness after flipping in a pool. Otoscopic exam is normal. Audiogram shows type A tymps and normal hearing AU. Her eustachian tube function was ntoed to be normal, but given her history and symptoms, will treat her for eustachian tube dysfunction. She had previous allergic reaction to bactrim with residual globus sensation. She has moderate postcricoid inflammation on exam. Will treat with oral steroids then reevaluate.\par \par - Medrol dose pack. The potential side effects of high-dose steroid use were discussed at length. These risks include but are not limited to: increased appetite, insomnia, fluid retention, mood swings, weight gain, change in blood pressure, high blood glucose, possible adrenal suppression, osteoporosis, avascular necrosis of the hip, menstrual irregularities (if applicable), and cataracts\par - Flonase 2 sprays BID\par - Follow up in 2 weeks

## 2022-09-26 NOTE — DATA REVIEWED
[de-identified] : \par -TYMPS: TYPE A AU (ETF WNL AU)\par -HEARING -8000 HZ AU\par RECS: 1) ENT F/U 2)RE-EVAL AS PER MD

## 2022-09-26 NOTE — HISTORY OF PRESENT ILLNESS
[de-identified] : Donato Nuñez is a 31 yo female who was referred by Dr. Maldonado who presents for evaluation of throat swelling. She states that a few weeks ago, she did a flip in the water and had significant flare in pain. She developed bilateral aural pressure. She was seen at [facility] [place] who gave her steroid/antibiotic ear drops and told to use claritin. This did not help with her symptoms. She then developed an arm infection and was prescribed bactrim. She had throat swelling that night and went to the ED. Seh did nto require intubation but she was given benadryl and oral steroids. She was given a new antibiotic. She continued to have throat swelling sensation worsening throughout the day with increased hoarseness of her voice. She denies dysphagia, but notes mild odynophagia. She denies aspiration episodes. She denies fevers, chills. She notes intermittent otalgia. She denies otorrhea. She feels that her hearing is diminished. She notes intermittent tinnitus but denies vertigo. She denies recurrent ear infections. She denies nasal congestion, rhinorhrea, or postnasal drainage. She has not had a recent sinus infection.

## 2022-09-26 NOTE — REVIEW OF SYSTEMS
[Seasonal Allergies] : seasonal allergies [Ear Drainage] : ear drainage [Hoarseness] : hoarseness [Throat Clearing] : throat clearing [Throat Pain] : throat pain [Negative] : Heme/Lymph

## 2022-10-05 ENCOUNTER — APPOINTMENT (OUTPATIENT)
Dept: OTOLARYNGOLOGY | Facility: CLINIC | Age: 32
End: 2022-10-05

## 2022-10-05 VITALS
HEART RATE: 78 BPM | WEIGHT: 175 LBS | HEIGHT: 66 IN | SYSTOLIC BLOOD PRESSURE: 120 MMHG | DIASTOLIC BLOOD PRESSURE: 80 MMHG | BODY MASS INDEX: 28.12 KG/M2

## 2022-10-05 PROCEDURE — 31575 DIAGNOSTIC LARYNGOSCOPY: CPT

## 2022-10-05 PROCEDURE — 99213 OFFICE O/P EST LOW 20 MIN: CPT | Mod: 25

## 2022-10-05 NOTE — ASSESSMENT
[FreeTextEntry1] : Mary Nuñez presents for follow-up of bilateral aural fullness and globus sensation. She had bilateral aural fullness after flipping in a pool. Otoscopic exam is normal.Previous audiogram showed type A tymps and normal hearing AU. Her eustachian tube function was noted to be normal, but given her history and symptoms, she was treated for eustachian tube dysfunction. She had previous allergic reaction to bactrim with residual globus sensation. She was treated with oral steroids. Flexible laryngoscopy today again shows significant postcricoid inflammation. We discussed that laryngopharyngeal reflux could be exacerbating her symptoms. Will start antireflux medication and reflux precautions.\par \par - Continue flonase 2 sprays BID to each nostril.\par - Omeprazole 20 mg qd. Risks of PPI use were discussed including but not limited to osteoporosis, pulmonary issues, GI distress.\par - Antireflux lifestyle and dietary precautions.\par - Follow up in 1 month.

## 2022-10-05 NOTE — REASON FOR VISIT
[Subsequent Evaluation] : a subsequent evaluation for [FreeTextEntry2] : lumps in throat and ear pressure

## 2022-10-05 NOTE — HISTORY OF PRESENT ILLNESS
[de-identified] : Donato Nuñez is a 33 yo female who was referred by Dr. Maldonado who presents for evaluation of throat swelling. She states that a few weeks ago, she did a flip in the water and had significant flare in pain. She developed bilateral aural pressure. She was seen at [facility] who gave her steroid/antibiotic ear drops and told to use claritin. This did not help with her symptoms. She then developed an arm infection and was prescribed bactrim. She had throat swelling that night and went to the ED. Seh did nto require intubation but she was given benadryl and oral steroids. She was given a new antibiotic. She continued to have throat swelling sensation worsening throughout the day with increased hoarseness of her voice. She denies dysphagia, but notes mild odynophagia. She denies aspiration episodes. She denies fevers, chills. She notes intermittent otalgia. She denies otorrhea. She feels that her hearing is diminished. She notes intermittent tinnitus but denies vertigo. She denies recurrent ear infections. She denies nasal congestion, rhinorhrea, or postnasal drainage. She has not had a recent sinus infection. [FreeTextEntry1] : 10/5/22 - Mary Nuñez presents for follow-up. She used medrol dose pack and flonase. She notes that her aural pressure is worse. She notes left side is worse. She notes diminished hearing from the left ear. She notes intermittent tinnitus that lasts for seconds but denies vertigo. She notes intermittent sharp pain from the left ear. She denies otorrhea. She denies fevers, chills. She feels that her throat swelling sensation is the same. She has constant globus sensation. She denies dyspnea, dysphonia. She denies dysphagia but feels that it is uncomfortable. She denies heartburn or food regurgitation.

## 2022-11-11 ENCOUNTER — APPOINTMENT (OUTPATIENT)
Dept: OTOLARYNGOLOGY | Facility: CLINIC | Age: 32
End: 2022-11-11

## 2022-11-11 VITALS
WEIGHT: 175 LBS | SYSTOLIC BLOOD PRESSURE: 117 MMHG | HEIGHT: 66 IN | HEART RATE: 58 BPM | BODY MASS INDEX: 28.12 KG/M2 | DIASTOLIC BLOOD PRESSURE: 80 MMHG

## 2022-11-11 PROCEDURE — 99213 OFFICE O/P EST LOW 20 MIN: CPT

## 2022-11-11 NOTE — HISTORY OF PRESENT ILLNESS
[de-identified] : Donato Nuñez is a 31 yo female who was referred by Dr. Maldonado who presents for evaluation of throat swelling. She states that a few weeks ago, she did a flip in the water and had significant flare in pain. She developed bilateral aural pressure. She was seen at [facility] who gave her steroid/antibiotic ear drops and told to use claritin. This did not help with her symptoms. She then developed an arm infection and was prescribed bactrim. She had throat swelling that night and went to the ED. Seh did nto require intubation but she was given benadryl and oral steroids. She was given a new antibiotic. She continued to have throat swelling sensation worsening throughout the day with increased hoarseness of her voice. She denies dysphagia, but notes mild odynophagia. She denies aspiration episodes. She denies fevers, chills. She notes intermittent otalgia. She denies otorrhea. She feels that her hearing is diminished. She notes intermittent tinnitus but denies vertigo. She denies recurrent ear infections. She denies nasal congestion, rhinorhrea, or postnasal drainage. She has not had a recent sinus infection. [FreeTextEntry1] : 10/5/22 - Mary Nuñez presents for follow-up. She used medrol dose pack and flonase. She notes that her aural pressure is worse. She notes left side is worse. She notes diminished hearing from the left ear. She notes intermittent tinnitus that lasts for seconds but denies vertigo. She notes intermittent sharp pain from the left ear. She denies otorrhea. She denies fevers, chills. She feels that her throat swelling sensation is the same. She has constant globus sensation. She denies dyspnea, dysphonia. She denies dysphagia but feels that it is uncomfortable. She denies heartburn or food regurgitation. \par \par 11/11/22 - Mary Nuñez presents for follow-up. She is on omeprazole and flonase. She states that her globus sensation has improved, but does intermittently recur when she has wine. She denies dysphagia, odynophagia. She notes that her voice has improved and she denies dyspnea. she denies fevers. She notes that her left aural fullness has improved. She notes that her hearing is back to normal. She notes very rare tinnitus.

## 2022-11-11 NOTE — ASSESSMENT
[FreeTextEntry1] : Mary Nuñez presents for follow-up of bilateral aural fullness and globus sensation. She had bilateral aural fullness after flipping in a pool. Previous audiogram showed type A tymps and normal hearing AU. Her eustachian tube function was noted to be normal, but given her history and symptoms, she was treated for eustachian tube dysfunction. She notes improvement in aural fullness with intermittent episodes and very brief intermittent nonpulsatile left tinnitus. She had previous allergic reaction to bactrim with residual globus sensation. She was treated with oral steroids. Previous flexible laryngoscopy today showed significant postcricoid inflammation. She was started on omeprazole and antireflux precautions. She notes improvement in globus sensation and throat discomfort.\par \par - Continue flonase 2 sprays BID to each nostril.\par - Continue omeprazole 20 mg qd. Risks of PPI use were discussed including but not limited to osteoporosis, pulmonary issues, GI distress.\par - Antireflux lifestyle and dietary precautions.\par - Follow up in 2 months with flexible laryngoscopy at that time.

## 2023-02-08 RX ORDER — FLUTICASONE PROPIONATE 50 UG/1
50 SPRAY, METERED NASAL TWICE DAILY
Qty: 1 | Refills: 3 | Status: ACTIVE | COMMUNITY
Start: 2023-02-08 | End: 1900-01-01

## 2023-02-08 RX ORDER — AZELASTINE HYDROCHLORIDE 137 UG/1
137 SPRAY, METERED NASAL TWICE DAILY
Qty: 3 | Refills: 3 | Status: ACTIVE | COMMUNITY
Start: 2023-02-08 | End: 1900-01-01

## 2023-02-08 RX ORDER — OMEPRAZOLE 20 MG/1
20 CAPSULE, DELAYED RELEASE ORAL
Qty: 90 | Refills: 0 | Status: ACTIVE | COMMUNITY
Start: 2022-10-05 | End: 1900-01-01

## 2023-02-21 ENCOUNTER — APPOINTMENT (OUTPATIENT)
Dept: OTOLARYNGOLOGY | Facility: CLINIC | Age: 33
End: 2023-02-21
Payer: COMMERCIAL

## 2023-02-21 VITALS
WEIGHT: 175 LBS | SYSTOLIC BLOOD PRESSURE: 119 MMHG | DIASTOLIC BLOOD PRESSURE: 84 MMHG | HEIGHT: 66 IN | BODY MASS INDEX: 28.12 KG/M2 | HEART RATE: 89 BPM

## 2023-02-21 DIAGNOSIS — K21.9 GASTRO-ESOPHAGEAL REFLUX DISEASE W/OUT ESOPHAGITIS: ICD-10-CM

## 2023-02-21 DIAGNOSIS — H69.83 OTHER SPECIFIED DISORDERS OF EUSTACHIAN TUBE, BILATERAL: ICD-10-CM

## 2023-02-21 DIAGNOSIS — R09.89 OTHER SPECIFIED SYMPTOMS AND SIGNS INVOLVING THE CIRCULATORY AND RESPIRATORY SYSTEMS: ICD-10-CM

## 2023-02-21 PROCEDURE — 92567 TYMPANOMETRY: CPT

## 2023-02-21 PROCEDURE — 92557 COMPREHENSIVE HEARING TEST: CPT

## 2023-02-21 PROCEDURE — 99214 OFFICE O/P EST MOD 30 MIN: CPT | Mod: 25

## 2023-02-21 PROCEDURE — 31575 DIAGNOSTIC LARYNGOSCOPY: CPT

## 2023-02-21 RX ORDER — OMEPRAZOLE 40 MG/1
40 CAPSULE, DELAYED RELEASE ORAL
Qty: 90 | Refills: 1 | Status: ACTIVE | COMMUNITY
Start: 2023-02-21 | End: 1900-01-01

## 2023-02-21 RX ORDER — AZELASTINE HYDROCHLORIDE 137 UG/1
137 SPRAY, METERED NASAL
Qty: 1 | Refills: 1 | Status: ACTIVE | COMMUNITY
Start: 2023-02-21 | End: 1900-01-01

## 2023-02-21 RX ORDER — FAMOTIDINE 20 MG/1
20 TABLET, FILM COATED ORAL
Qty: 90 | Refills: 0 | Status: ACTIVE | COMMUNITY
Start: 2023-02-21 | End: 1900-01-01

## 2023-02-21 NOTE — ASSESSMENT
[FreeTextEntry1] : Mary Nuñez presents for follow-up of bilateral aural fullness and globus sensation. Previous audiogram showed type A tymps and normal hearing AU. Her eustachian tube function was previously noted to be normal, but given her history and symptoms, she was treated for eustachian tube dysfunction. She was treated with flonase and omeprazole, with improvement in symptoms. These symptoms recurred and she is having significant aural fullness and otalgia, along with reflux symptoms. Flexible laryngoscopy today shows significant postcricoid inflammation. Audiogram showed type Ad tymps AU and normal hearing AU. Will adjust her regimen.\par \par - Continue flonase 2 sprays BID to each nostril.\par - Start azelastine 2 sprays BID to each nostril.\par - Increase omeprazole to 40 mg qd. Risks of PPI use were discussed including but not limited to osteoporosis, pulmonary issues, GI distress.\par - Start famotidine 20 mg qhs.\par - Antireflux lifestyle and dietary precautions.\par - GI referral.\par - Follow up in 3 months.\par

## 2023-02-21 NOTE — HISTORY OF PRESENT ILLNESS
[de-identified] : Donato Nuñez is a 31 yo female who was referred by Dr. Maldonado who presents for evaluation of throat swelling. She states that a few weeks ago, she did a flip in the water and had significant flare in pain. She developed bilateral aural pressure. She was seen at [facility] who gave her steroid/antibiotic ear drops and told to use claritin. This did not help with her symptoms. She then developed an arm infection and was prescribed bactrim. She had throat swelling that night and went to the ED. Seh did nto require intubation but she was given benadryl and oral steroids. She was given a new antibiotic. She continued to have throat swelling sensation worsening throughout the day with increased hoarseness of her voice. She denies dysphagia, but notes mild odynophagia. She denies aspiration episodes. She denies fevers, chills. She notes intermittent otalgia. She denies otorrhea. She feels that her hearing is diminished. She notes intermittent tinnitus but denies vertigo. She denies recurrent ear infections. She denies nasal congestion, rhinorhrea, or postnasal drainage. She has not had a recent sinus infection. [FreeTextEntry1] : 10/5/22 - Mary Nuñez presents for follow-up. She used medrol dose pack and flonase. She notes that her aural pressure is worse. She notes left side is worse. She notes diminished hearing from the left ear. She notes intermittent tinnitus that lasts for seconds but denies vertigo. She notes intermittent sharp pain from the left ear. She denies otorrhea. She denies fevers, chills. She feels that her throat swelling sensation is the same. She has constant globus sensation. She denies dyspnea, dysphonia. She denies dysphagia but feels that it is uncomfortable. She denies heartburn or food regurgitation. \par \par 11/11/22 - Mary Nuñez presents for follow-up. She is on omeprazole and flonase. She states that her globus sensation has improved, but does intermittently recur when she has wine. She denies dysphagia, odynophagia. She notes that her voice has improved and she denies dyspnea. she denies fevers. She notes that her left aural fullness has improved. She notes that her hearing is back to normal. She notes very rare tinnitus.\par \par 2/21/23 - Ms. Nuñez presents for follow-up. She has been omeprazole for reflux and flonase for eustachina tube dysfunction. She was doing well until a few weeks ago. She developed left greater than right aural fullness and popping. She had significant pain. She denies otorrhea. She denies vertigo but notes left greater than right tinnitus. she feels her hearing is diminished on the right. she has return of globus sensation and dysphagia. She has been on oral antibiotics and steroids without improvement. She denies fevers or chills.

## 2023-04-06 ENCOUNTER — APPOINTMENT (OUTPATIENT)
Dept: OTOLARYNGOLOGY | Facility: CLINIC | Age: 33
End: 2023-04-06

## 2024-05-29 NOTE — ED ADULT NURSE NOTE - EENT ASSESSMENT, MLM
The patient called to change his prescriptions medication to [facility] in [place]. The patient needs his Furosemide 40 MG prescription to be sent to [place] pharmacy as well as 90 days supply if possible.    WDL

## 2024-10-03 ENCOUNTER — APPOINTMENT (OUTPATIENT)
Dept: SPEECH THERAPY | Facility: CLINIC | Age: 34
End: 2024-10-03

## 2024-11-20 ENCOUNTER — APPOINTMENT (OUTPATIENT)
Dept: OTOLARYNGOLOGY | Facility: CLINIC | Age: 34
End: 2024-11-20

## 2025-02-03 NOTE — ED ADULT NURSE NOTE - NSSEPSISSUSPECTED_ED_A_ED
Plan: Pt to use clobestsol and opzulera for flares and patient to return to clinic if worsens. Render In Strict Bullet Format?: No Detail Level: Zone No

## 2025-05-12 ENCOUNTER — INPATIENT (INPATIENT)
Facility: HOSPITAL | Age: 35
LOS: 0 days | Discharge: HOME | DRG: 392 | End: 2025-05-13
Attending: STUDENT IN AN ORGANIZED HEALTH CARE EDUCATION/TRAINING PROGRAM | Admitting: STUDENT IN AN ORGANIZED HEALTH CARE EDUCATION/TRAINING PROGRAM
Payer: COMMERCIAL

## 2025-05-12 VITALS
RESPIRATION RATE: 18 BRPM | SYSTOLIC BLOOD PRESSURE: 118 MMHG | OXYGEN SATURATION: 99 % | WEIGHT: 182.98 LBS | HEART RATE: 62 BPM | TEMPERATURE: 98 F | DIASTOLIC BLOOD PRESSURE: 91 MMHG

## 2025-05-12 DIAGNOSIS — Z87.312 PERSONAL HISTORY OF (HEALED) STRESS FRACTURE: Chronic | ICD-10-CM

## 2025-05-12 LAB
ALBUMIN SERPL ELPH-MCNC: 3.7 G/DL — SIGNIFICANT CHANGE UP (ref 3.3–5)
ALP SERPL-CCNC: 30 U/L — LOW (ref 40–120)
ALT FLD-CCNC: 25 U/L — SIGNIFICANT CHANGE UP (ref 12–78)
ANION GAP SERPL CALC-SCNC: 6 MMOL/L — SIGNIFICANT CHANGE UP (ref 5–17)
APPEARANCE UR: CLEAR — SIGNIFICANT CHANGE UP
AST SERPL-CCNC: 25 U/L — SIGNIFICANT CHANGE UP (ref 15–37)
BACTERIA # UR AUTO: ABNORMAL /HPF
BASOPHILS # BLD AUTO: 0.03 K/UL — SIGNIFICANT CHANGE UP (ref 0–0.2)
BASOPHILS NFR BLD AUTO: 0.7 % — SIGNIFICANT CHANGE UP (ref 0–2)
BILIRUB SERPL-MCNC: 0.4 MG/DL — SIGNIFICANT CHANGE UP (ref 0.2–1.2)
BILIRUB UR-MCNC: NEGATIVE — SIGNIFICANT CHANGE UP
BUN SERPL-MCNC: 14 MG/DL — SIGNIFICANT CHANGE UP (ref 7–23)
CALCIUM SERPL-MCNC: 9 MG/DL — SIGNIFICANT CHANGE UP (ref 8.5–10.1)
CAST: 0 /LPF — SIGNIFICANT CHANGE UP (ref 0–4)
CHLORIDE SERPL-SCNC: 107 MMOL/L — SIGNIFICANT CHANGE UP (ref 96–108)
CO2 SERPL-SCNC: 26 MMOL/L — SIGNIFICANT CHANGE UP (ref 22–31)
COLOR SPEC: SIGNIFICANT CHANGE UP
CREAT SERPL-MCNC: 0.92 MG/DL — SIGNIFICANT CHANGE UP (ref 0.5–1.3)
DIFF PNL FLD: ABNORMAL
EGFR: 84 ML/MIN/1.73M2 — SIGNIFICANT CHANGE UP
EGFR: 84 ML/MIN/1.73M2 — SIGNIFICANT CHANGE UP
EOSINOPHIL # BLD AUTO: 0.14 K/UL — SIGNIFICANT CHANGE UP (ref 0–0.5)
EOSINOPHIL NFR BLD AUTO: 3.2 % — SIGNIFICANT CHANGE UP (ref 0–6)
GLUCOSE BLDC GLUCOMTR-MCNC: 102 MG/DL — HIGH (ref 70–99)
GLUCOSE SERPL-MCNC: 122 MG/DL — HIGH (ref 70–99)
GLUCOSE UR QL: NEGATIVE MG/DL — SIGNIFICANT CHANGE UP
HCG SERPL-ACNC: <1 MIU/ML — SIGNIFICANT CHANGE UP
HCT VFR BLD CALC: 36.3 % — SIGNIFICANT CHANGE UP (ref 34.5–45)
HGB BLD-MCNC: 11.9 G/DL — SIGNIFICANT CHANGE UP (ref 11.5–15.5)
IMM GRANULOCYTES # BLD AUTO: 0.01 K/UL — SIGNIFICANT CHANGE UP (ref 0–0.07)
IMM GRANULOCYTES NFR BLD AUTO: 0.2 % — SIGNIFICANT CHANGE UP (ref 0–0.9)
KETONES UR QL: NEGATIVE MG/DL — SIGNIFICANT CHANGE UP
LACTATE SERPL-SCNC: 1.1 MMOL/L — SIGNIFICANT CHANGE UP (ref 0.7–2)
LEUKOCYTE ESTERASE UR-ACNC: NEGATIVE — SIGNIFICANT CHANGE UP
LIDOCAIN IGE QN: 36 U/L — SIGNIFICANT CHANGE UP (ref 13–75)
LYMPHOCYTES # BLD AUTO: 2.72 K/UL — SIGNIFICANT CHANGE UP (ref 1–3.3)
LYMPHOCYTES NFR BLD AUTO: 62.7 % — HIGH (ref 13–44)
MCHC RBC-ENTMCNC: 29.2 PG — SIGNIFICANT CHANGE UP (ref 27–34)
MCHC RBC-ENTMCNC: 32.8 G/DL — SIGNIFICANT CHANGE UP (ref 32–36)
MCV RBC AUTO: 89.2 FL — SIGNIFICANT CHANGE UP (ref 80–100)
MONOCYTES # BLD AUTO: 0.29 K/UL — SIGNIFICANT CHANGE UP (ref 0–0.9)
MONOCYTES NFR BLD AUTO: 6.7 % — SIGNIFICANT CHANGE UP (ref 2–14)
NEUTROPHILS # BLD AUTO: 1.15 K/UL — LOW (ref 1.8–7.4)
NEUTROPHILS NFR BLD AUTO: 26.5 % — LOW (ref 43–77)
NITRITE UR-MCNC: NEGATIVE — SIGNIFICANT CHANGE UP
NRBC # BLD AUTO: 0 K/UL — SIGNIFICANT CHANGE UP (ref 0–0)
NRBC # FLD: 0 K/UL — SIGNIFICANT CHANGE UP (ref 0–0)
NRBC BLD AUTO-RTO: 0 /100 WBCS — SIGNIFICANT CHANGE UP (ref 0–0)
PH UR: 8 — SIGNIFICANT CHANGE UP (ref 5–8)
PLATELET # BLD AUTO: 177 K/UL — SIGNIFICANT CHANGE UP (ref 150–400)
PMV BLD: 10.2 FL — SIGNIFICANT CHANGE UP (ref 7–13)
POTASSIUM SERPL-MCNC: 4 MMOL/L — SIGNIFICANT CHANGE UP (ref 3.5–5.3)
POTASSIUM SERPL-SCNC: 4 MMOL/L — SIGNIFICANT CHANGE UP (ref 3.5–5.3)
PROT SERPL-MCNC: 6.6 GM/DL — SIGNIFICANT CHANGE UP (ref 6–8.3)
PROT UR-MCNC: NEGATIVE MG/DL — SIGNIFICANT CHANGE UP
RBC # BLD: 4.07 M/UL — SIGNIFICANT CHANGE UP (ref 3.8–5.2)
RBC # FLD: 12.2 % — SIGNIFICANT CHANGE UP (ref 10.3–14.5)
RBC CASTS # UR COMP ASSIST: 26 /HPF — HIGH (ref 0–4)
SODIUM SERPL-SCNC: 139 MMOL/L — SIGNIFICANT CHANGE UP (ref 135–145)
SP GR SPEC: >1.03 — HIGH (ref 1–1.03)
SQUAMOUS # UR AUTO: 3 /HPF — SIGNIFICANT CHANGE UP (ref 0–5)
UROBILINOGEN FLD QL: 0.2 MG/DL — SIGNIFICANT CHANGE UP (ref 0.2–1)
WBC # BLD: 4.34 K/UL — SIGNIFICANT CHANGE UP (ref 3.8–10.5)
WBC # FLD AUTO: 4.34 K/UL — SIGNIFICANT CHANGE UP (ref 3.8–10.5)
WBC UR QL: 3 /HPF — SIGNIFICANT CHANGE UP (ref 0–5)

## 2025-05-12 PROCEDURE — 82962 GLUCOSE BLOOD TEST: CPT

## 2025-05-12 PROCEDURE — 74018 RADEX ABDOMEN 1 VIEW: CPT | Mod: 26,59

## 2025-05-12 PROCEDURE — 74177 CT ABD & PELVIS W/CONTRAST: CPT | Mod: 26

## 2025-05-12 PROCEDURE — 74018 RADEX ABDOMEN 1 VIEW: CPT

## 2025-05-12 PROCEDURE — 74019 RADEX ABDOMEN 2 VIEWS: CPT

## 2025-05-12 PROCEDURE — G0378: CPT

## 2025-05-12 PROCEDURE — 83036 HEMOGLOBIN GLYCOSYLATED A1C: CPT

## 2025-05-12 PROCEDURE — 76705 ECHO EXAM OF ABDOMEN: CPT | Mod: 26

## 2025-05-12 PROCEDURE — 85027 COMPLETE CBC AUTOMATED: CPT

## 2025-05-12 PROCEDURE — 99222 1ST HOSP IP/OBS MODERATE 55: CPT

## 2025-05-12 PROCEDURE — 36415 COLL VENOUS BLD VENIPUNCTURE: CPT

## 2025-05-12 PROCEDURE — 74176 CT ABD & PELVIS W/O CONTRAST: CPT | Mod: 26

## 2025-05-12 PROCEDURE — 80053 COMPREHEN METABOLIC PANEL: CPT

## 2025-05-12 PROCEDURE — 99285 EMERGENCY DEPT VISIT HI MDM: CPT

## 2025-05-12 PROCEDURE — 74019 RADEX ABDOMEN 2 VIEWS: CPT | Mod: 26

## 2025-05-12 RX ORDER — BISACODYL 5 MG
10 TABLET, DELAYED RELEASE (ENTERIC COATED) ORAL ONCE
Refills: 0 | Status: COMPLETED | OUTPATIENT
Start: 2025-05-12 | End: 2025-05-12

## 2025-05-12 RX ORDER — GABAPENTIN 400 MG/1
1 CAPSULE ORAL
Refills: 0 | DISCHARGE

## 2025-05-12 RX ORDER — GLUCAGON 3 MG/1
1 POWDER NASAL ONCE
Refills: 0 | Status: DISCONTINUED | OUTPATIENT
Start: 2025-05-12 | End: 2025-05-13

## 2025-05-12 RX ORDER — RIZATRIPTAN BENZOATE 5 MG/1
1 TABLET ORAL
Refills: 0 | DISCHARGE

## 2025-05-12 RX ORDER — HYDROMORPHONE/SOD CHLOR,ISO/PF 2 MG/10 ML
0.5 SYRINGE (ML) INJECTION EVERY 4 HOURS
Refills: 0 | Status: DISCONTINUED | OUTPATIENT
Start: 2025-05-12 | End: 2025-05-13

## 2025-05-12 RX ORDER — MELATONIN 5 MG
3 TABLET ORAL AT BEDTIME
Refills: 0 | Status: DISCONTINUED | OUTPATIENT
Start: 2025-05-12 | End: 2025-05-13

## 2025-05-12 RX ORDER — DULOXETINE 20 MG/1
1 CAPSULE, DELAYED RELEASE ORAL
Refills: 0 | DISCHARGE

## 2025-05-12 RX ORDER — DULOXETINE 20 MG/1
40 CAPSULE, DELAYED RELEASE ORAL DAILY
Refills: 0 | Status: DISCONTINUED | OUTPATIENT
Start: 2025-05-12 | End: 2025-05-13

## 2025-05-12 RX ORDER — MAGNESIUM, ALUMINUM HYDROXIDE 200-200 MG
30 TABLET,CHEWABLE ORAL EVERY 4 HOURS
Refills: 0 | Status: DISCONTINUED | OUTPATIENT
Start: 2025-05-12 | End: 2025-05-13

## 2025-05-12 RX ORDER — TIZANIDINE 4 MG/1
0.5 TABLET ORAL
Refills: 0 | DISCHARGE

## 2025-05-12 RX ORDER — HYDROMORPHONE/SOD CHLOR,ISO/PF 2 MG/10 ML
1 SYRINGE (ML) INJECTION EVERY 4 HOURS
Refills: 0 | Status: DISCONTINUED | OUTPATIENT
Start: 2025-05-12 | End: 2025-05-13

## 2025-05-12 RX ORDER — DEXTROSE 50 % IN WATER 50 %
12.5 SYRINGE (ML) INTRAVENOUS ONCE
Refills: 0 | Status: DISCONTINUED | OUTPATIENT
Start: 2025-05-12 | End: 2025-05-13

## 2025-05-12 RX ORDER — LORAZEPAM 4 MG/ML
1 VIAL (ML) INJECTION ONCE
Refills: 0 | Status: DISCONTINUED | OUTPATIENT
Start: 2025-05-12 | End: 2025-05-12

## 2025-05-12 RX ORDER — SODIUM CHLORIDE 9 G/1000ML
1000 INJECTION, SOLUTION INTRAVENOUS
Refills: 0 | Status: DISCONTINUED | OUTPATIENT
Start: 2025-05-12 | End: 2025-05-13

## 2025-05-12 RX ORDER — METOCLOPRAMIDE HCL 10 MG
10 TABLET ORAL ONCE
Refills: 0 | Status: COMPLETED | OUTPATIENT
Start: 2025-05-12 | End: 2025-05-12

## 2025-05-12 RX ORDER — DEXTROSE 50 % IN WATER 50 %
25 SYRINGE (ML) INTRAVENOUS ONCE
Refills: 0 | Status: DISCONTINUED | OUTPATIENT
Start: 2025-05-12 | End: 2025-05-13

## 2025-05-12 RX ORDER — DIPHENHYDRAMINE HCL 12.5MG/5ML
25 ELIXIR ORAL ONCE
Refills: 0 | Status: COMPLETED | OUTPATIENT
Start: 2025-05-12 | End: 2025-05-12

## 2025-05-12 RX ORDER — ONDANSETRON HCL/PF 4 MG/2 ML
4 VIAL (ML) INJECTION ONCE
Refills: 0 | Status: COMPLETED | OUTPATIENT
Start: 2025-05-12 | End: 2025-05-12

## 2025-05-12 RX ORDER — ONDANSETRON HCL/PF 4 MG/2 ML
4 VIAL (ML) INJECTION EVERY 8 HOURS
Refills: 0 | Status: DISCONTINUED | OUTPATIENT
Start: 2025-05-12 | End: 2025-05-13

## 2025-05-12 RX ORDER — ATOGEPANT 10 MG/1
1 TABLET ORAL
Refills: 0 | DISCHARGE

## 2025-05-12 RX ORDER — SUCRALFATE 1 G
1 TABLET ORAL ONCE
Refills: 0 | Status: COMPLETED | OUTPATIENT
Start: 2025-05-12 | End: 2025-05-12

## 2025-05-12 RX ORDER — ACETAMINOPHEN 500 MG/5ML
650 LIQUID (ML) ORAL EVERY 6 HOURS
Refills: 0 | Status: DISCONTINUED | OUTPATIENT
Start: 2025-05-12 | End: 2025-05-13

## 2025-05-12 RX ORDER — METOCLOPRAMIDE HCL 10 MG
5 TABLET ORAL
Refills: 0 | Status: DISCONTINUED | OUTPATIENT
Start: 2025-05-12 | End: 2025-05-13

## 2025-05-12 RX ORDER — GABAPENTIN 400 MG/1
100 CAPSULE ORAL DAILY
Refills: 0 | Status: DISCONTINUED | OUTPATIENT
Start: 2025-05-12 | End: 2025-05-13

## 2025-05-12 RX ORDER — DROSPIRENONE AND ETHINYL ESTRADIOL 0.03MG-3MG
1 KIT ORAL
Refills: 0 | DISCHARGE

## 2025-05-12 RX ORDER — DEXTROSE 50 % IN WATER 50 %
15 SYRINGE (ML) INTRAVENOUS ONCE
Refills: 0 | Status: DISCONTINUED | OUTPATIENT
Start: 2025-05-12 | End: 2025-05-13

## 2025-05-12 RX ORDER — ACETAMINOPHEN 500 MG/5ML
1000 LIQUID (ML) ORAL ONCE
Refills: 0 | Status: COMPLETED | OUTPATIENT
Start: 2025-05-12 | End: 2025-05-12

## 2025-05-12 RX ADMIN — Medication 1 MILLIGRAM(S): at 21:00

## 2025-05-12 RX ADMIN — Medication 1 MILLIGRAM(S): at 08:34

## 2025-05-12 RX ADMIN — Medication 20 MILLIGRAM(S): at 12:10

## 2025-05-12 RX ADMIN — Medication 1 MILLIGRAM(S): at 15:47

## 2025-05-12 RX ADMIN — Medication 400 MILLIGRAM(S): at 06:44

## 2025-05-12 RX ADMIN — Medication 25 MILLIGRAM(S): at 09:29

## 2025-05-12 RX ADMIN — Medication 1 GRAM(S): at 12:10

## 2025-05-12 RX ADMIN — Medication 4 MILLIGRAM(S): at 05:10

## 2025-05-12 RX ADMIN — SODIUM CHLORIDE 70 MILLILITER(S): 9 INJECTION, SOLUTION INTRAVENOUS at 15:46

## 2025-05-12 RX ADMIN — Medication 4 MILLIGRAM(S): at 05:09

## 2025-05-12 RX ADMIN — Medication 4 MILLIGRAM(S): at 09:29

## 2025-05-12 RX ADMIN — Medication 4 MILLIGRAM(S): at 11:49

## 2025-05-12 RX ADMIN — Medication 10 MILLIGRAM(S): at 09:29

## 2025-05-12 RX ADMIN — Medication 1 MILLIGRAM(S): at 20:33

## 2025-05-12 NOTE — CONSULT NOTE ADULT - SUBJECTIVE AND OBJECTIVE BOX
GI consult    HPI:    HPI:35-year-old female with myofascial pain, small fiber neuropathy, migraines presents to the ER complaining of abdominal pain.  Began about 1 hour prior to arrival.  States that the pain is 8 out of 10 in the epigastric region radiating down to and radiating down towards the bellybutton.  Denies nausea, diarrhea, vaginal bleeding, dysuria, hematuria. Reports one episode of emesis at home and constipation. CT showing concern for possible sbo pt admitted for further management.     Pt without chronic abd issues except constipation developed severe epigastric and generalized abd pain at 330 am and came to ER. No vomiting at home (vomited CT contrast x 1), +flatus and no BM however. CT suggested possible SBO and pain persists requiring dilaudid.  No prior abdominal surgery.     PAST MEDICAL & SURGICAL HISTORY:  No pertinent past medical history  Costochondritis  Myofascial pain syndrome  Small fiber neuropathy  Vestibular neuritis  Patulous eustachian tube  H/O stress fracture    FAMILY HISTORY: not pertinent     Social History:  no smoking alcohol or illicit drug use     Allergies    sulfa drugs (Unknown)    Intolerances        MEDICATIONS  (STANDING):  cetirizine 10 milliGRAM(s) Oral daily  Drospirenon-Ethinyl Estradiol 3mG-0.02mG 1 Tablet(s)   Oral daily  DULoxetine 40 milliGRAM(s) Oral daily  gabapentin 100 milliGRAM(s) Oral daily  lactated ringers. 1000 milliLiter(s) (70 mL/Hr) IV Continuous <Continuous>  Qulipta 60 milliGRAM(s)   Oral daily    MEDICATIONS  (PRN):  acetaminophen     Tablet .. 650 milliGRAM(s) Oral every 6 hours PRN Temp greater or equal to 38C (100.4F), Mild Pain (1 - 3)  aluminum hydroxide/magnesium hydroxide/simethicone Suspension 30 milliLiter(s) Oral every 4 hours PRN Dyspepsia  HYDROmorphone  Injectable 0.5 milliGRAM(s) IV Push every 4 hours PRN Moderate Pain (4 - 6)  HYDROmorphone  Injectable 1 milliGRAM(s) IV Push every 4 hours PRN Severe Pain (7 - 10)  melatonin 3 milliGRAM(s) Oral at bedtime PRN Insomnia  ondansetron Injectable 4 milliGRAM(s) IV Push every 8 hours PRN Nausea and/or Vomiting      ROS:  General:  No fevers, chills, or unexplained weight loss  Skin: No rash or bothersome skin lesions  Musculoskeletal: No arthalgias, myalgias or joint swelling  Eyes: No visual changes or eye pain  Ears: No hearing loss , otorrhea or ear pain  Nose, Mouth, Throat: No nasal congestion, rhinorrhea, oral lesions, postnasal drip or sore throat  Cardio: No chest pain or palpitations. no lower extremity edema. no syncope. no claudication.   Respiratory: No cough, shortness of breath or wheezing   GI:+ abdominal pain   : No urinary frequency, hematuria, incontinence, or dysuria  Neurologic: No headaches, parasthesias, confusion, dysarthria or gait instability  Psychiatric:  No anxiety or depression  Lymphatic:  No easy bruising, easy bleeding or swollen glands  Allergic: No itching, sneezing , watery eyes, clear rhinorrhea or recurrent infections    PE:  Vital Signs Last 24 Hrs  T(C): 36.6 (12 May 2025 16:18), Max: 36.8 (12 May 2025 07:07)  T(F): 97.8 (12 May 2025 16:18), Max: 98.2 (12 May 2025 07:07)  HR: 64 (12 May 2025 16:18) (61 - 81)  BP: 134/76 (12 May 2025 16:18) (110/69 - 139/92)  BP(mean): 79 (12 May 2025 15:40) (79 - 101)  RR: 20 (12 May 2025 16:18) (18 - 20)  SpO2: 95% (12 May 2025 16:18) (95% - 100%)    Parameters below as of 12 May 2025 16:18  Patient On (Oxygen Delivery Method): room air      mom at bedside  Constitutional: NAD, well-developed, A+Ox3  Anicteric   Respiratory: CTABL, breathing comfortably  Cardiovascular: S1 and S2, RRR  Gastrointestinal: +BS, soft, non tender, +mild generalized distension, no mass  Extremities: warm, well perfused, no edema  Psychiatric: Normal mood, normal affect  Neuro: moves all extremities, grossly intact  Skin: No rashes or lesions    LABS:                        11.9   4.34  )-----------( 177      ( 12 May 2025 05:02 )             36.3     05-12    139  |  107  |  14  ----------------------------<  122[H]  4.0   |  26  |  0.92    Ca    9.0      12 May 2025 05:02    TPro  6.6  /  Alb  3.7  /  TBili  0.4  /  DBili  x   /  AST  25  /  ALT  25  /  AlkPhos  30[L]  05-12      LIVER FUNCTIONS - ( 12 May 2025 05:02 )  Alb: 3.7 g/dL / Pro: 6.6 gm/dL / ALK PHOS: 30 U/L / ALT: 25 U/L / AST: 25 U/L / GGT: x             RADIOLOGY & ADDITIONAL STUDIES:    Radiology/Imaging, I have personally reviewed:  CTAP with iv contrast   IMPRESSION:  There are several mildly dilated mid small bowel loops in a subtle radial   configuration with mild mesenteric edema. There are 2 questionable   transition points. Differential includes early closed loop small bowel   obstruction versus enteritis. Surgical consultation is recommended.   Consider repeat CT with oral contrast.    ct ap with oral contrast    IMPRESSION:  *  Dilatation of small bowel loops, overall decreased in caliber compared   to prior examination performed earlier the same day.  *  Oral contrast has not yet reached the region of possible transition   points. Consider follow-up imaging to assess for passage of contrast.      
35-year-old female with myofascial pain, small fiber neuropathy, migraines presents to the ER complaining of abdominal pain.  Began about 1 hour prior to arrival.  States that the pain is 8 out of 10 in the epigastric region radiating down to and radiating down towards the bellybutton.  Denies nausea, vomiting, diarrhea, vaginal bleeding, dysuria, hematuria.    acetaminophen     Tablet .. 650 milliGRAM(s) Oral every 6 hours PRN  aluminum hydroxide/magnesium hydroxide/simethicone Suspension 30 milliLiter(s) Oral every 4 hours PRN  HYDROmorphone  Injectable 0.5 milliGRAM(s) IV Push every 4 hours PRN  HYDROmorphone  Injectable 1 milliGRAM(s) IV Push every 4 hours PRN  melatonin 3 milliGRAM(s) Oral at bedtime PRN  ondansetron Injectable 4 milliGRAM(s) IV Push every 8 hours PRN      Vital Signs Last 24 Hrs  T(C): 36.8 (12 May 2025 07:07), Max: 36.8 (12 May 2025 07:07)  T(F): 98.2 (12 May 2025 07:07), Max: 98.2 (12 May 2025 07:07)  HR: 81 (12 May 2025 07:07) (62 - 81)  BP: 139/92 (12 May 2025 07:07) (118/91 - 139/92)  BP(mean): 101 (12 May 2025 04:34) (101 - 101)  RR: 18 (12 May 2025 07:07) (18 - 18)  SpO2: 100% (12 May 2025 07:07) (99% - 100%)    Parameters below as of 12 May 2025 07:07  Patient On (Oxygen Delivery Method): room air        I&O's Summary    I&O's Detail      Physical Exam  Gen: NAD, hyperventiliating  Neuro: A&Ox3  Lungs: CTAB  CV: S1/S2, RRR  Abd: Soft, ND, some epigastric tenderness w/ deep palpation  Extremities: No LE edema bl                          11.9   4.34  )-----------( 177      ( 12 May 2025 05:02 )             36.3       05-12    139  |  107  |  14  ----------------------------<  122[H]  4.0   |  26  |  0.92    Ca    9.0      12 May 2025 05:02    TPro  6.6  /  Alb  3.7  /  TBili  0.4  /  DBili  x   /  AST  25  /  ALT  25  /  AlkPhos  30[L]  05-12

## 2025-05-12 NOTE — H&P ADULT - ASSESSMENT
35-year-old female with myofascial pain, small fiber neuropathy, migraines presents to the ER complaining of abdominal pain.  Began about 1 hour prior to arrival.  States that the pain is 8 out of 10 in the epigastric region radiating down to and radiating down towards the bellybutton.  Denies nausea, diarrhea, vaginal bleeding, dysuria, hematuria. Reports one episode of emesis at home and constipation. CT showing concern for possible sbo pt admitted for further management.       #SBO?   #Abdominal pain   - CTAP w/ IV contast showing 2 questionable transition points   - CTAP w/ oral contrast Dilatation of small bowel loops, overall decreased in caliber compared   to prior examination performed earlier the same day w/ oral contrast that has not yet reached transition points  - f/u JAG   - Kiara consulted, appreciate recs   - GI consulted appreciate recs   - dilaudid for pain control   - maintenance fluids   - npo advanced diet as tolerated     #Myofascial pain  #Mygraines  - cw home birth control    - cw home qulipta   - hold tizanadine as can cause sbo   - cw home gabapentin   - cw home duloxetine

## 2025-05-12 NOTE — ED STATDOCS - OBJECTIVE STATEMENT
35-year-old female with myofascial pain, small fiber neuropathy, migraines presents to the ER complaining of abdominal pain.  Began about 1 hour prior to arrival.  States that the pain is 8 out of 10 in the epigastric region radiating down to and radiating down towards the bellybutton.  Denies nausea, vomiting, diarrhea, vaginal bleeding, dysuria, hematuria.

## 2025-05-12 NOTE — ED ADULT NURSE NOTE - NSICDXPASTMEDICALHX_GEN_ALL_CORE_FT
PAST MEDICAL HISTORY:  Costochondritis     Myofascial pain syndrome     No pertinent past medical history     Patulous eustachian tube     Small fiber neuropathy     Vestibular neuritis

## 2025-05-12 NOTE — CONSULT NOTE ADULT - ASSESSMENT
A/P: 35y  Female w/ abd pain  Repeat CT shows contrast has not reached transition point  Unremarkable physical exam  Repeat abd x-ray  GI cocktail  Medical admission if needed  GI eval  D/w Dr. Casillas
Acute abd pain and CT with possible enteritis (vs SBO)  No clear risk factors for SBO and with absence of vomiting and positive finding of flatus, SBO seems unlikely.  Suspect enteritis/ileus, possibly infectious cause.  No clear risk factors for ischemia/vascular etiology.     Rec:  -OOB as able  -limit narcotics as able  -start reglan 5 mg IV QID x 48h  -dulcolax suppository for constipation seen on CT images  -serial exams  -IVF  -unclear if abx have a role with normal WBC and no fever, although may help "enteritis"  -will follow

## 2025-05-12 NOTE — ED ADULT NURSE REASSESSMENT NOTE - NS ED NURSE REASSESS COMMENT FT1
Received report from JIM Handley. Pt resting comfortably in bed. Plan of care explained. Pending results. VS as charted. Mother at bedside. Safety and comfort measures in place.

## 2025-05-12 NOTE — ED ADULT NURSE NOTE - OBJECTIVE STATEMENT
Pt presents to the ED with c/o upper abdominal pain that radiates towards belly button which began about 1hr PTA. Pt denies taking any medication PTA.

## 2025-05-12 NOTE — ED ADULT TRIAGE NOTE - CHIEF COMPLAINT QUOTE
Pt presents ambulatory and placed in wheelchair due to pain, pt c/o abd pain x1 hour PTA, no meds PTA. Pain is upper abd that radiates to belly button. Pt allergy to sulfates.

## 2025-05-12 NOTE — H&P ADULT - HISTORY OF PRESENT ILLNESS
HPI:35-year-old female with myofascial pain, small fiber neuropathy, migraines presents to the ER complaining of abdominal pain.  Began about 1 hour prior to arrival.  States that the pain is 8 out of 10 in the epigastric region radiating down to and radiating down towards the bellybutton.  Denies nausea, diarrhea, vaginal bleeding, dysuria, hematuria. Reports one episode of emesis at home and constipation. CT showing concern for possible sbo pt admitted for further management.     PAST MEDICAL & SURGICAL HISTORY:  No pertinent past medical history  Costochondritis  Myofascial pain syndrome  Small fiber neuropathy  Vestibular neuritis  Patulous eustachian tube  H/O stress fracture    FAMILY HISTORY: not pertinent     Social History:  no smoking alcohol or illicit drug use     Allergies    sulfa drugs (Unknown)    Intolerances        MEDICATIONS  (STANDING):  cetirizine 10 milliGRAM(s) Oral daily  Drospirenon-Ethinyl Estradiol 3mG-0.02mG 1 Tablet(s)   Oral daily  DULoxetine 40 milliGRAM(s) Oral daily  gabapentin 100 milliGRAM(s) Oral daily  lactated ringers. 1000 milliLiter(s) (70 mL/Hr) IV Continuous <Continuous>  Qulipta 60 milliGRAM(s)   Oral daily    MEDICATIONS  (PRN):  acetaminophen     Tablet .. 650 milliGRAM(s) Oral every 6 hours PRN Temp greater or equal to 38C (100.4F), Mild Pain (1 - 3)  aluminum hydroxide/magnesium hydroxide/simethicone Suspension 30 milliLiter(s) Oral every 4 hours PRN Dyspepsia  HYDROmorphone  Injectable 0.5 milliGRAM(s) IV Push every 4 hours PRN Moderate Pain (4 - 6)  HYDROmorphone  Injectable 1 milliGRAM(s) IV Push every 4 hours PRN Severe Pain (7 - 10)  melatonin 3 milliGRAM(s) Oral at bedtime PRN Insomnia  ondansetron Injectable 4 milliGRAM(s) IV Push every 8 hours PRN Nausea and/or Vomiting      ROS:  General:  No fevers, chills, or unexplained weight loss  Skin: No rash or bothersome skin lesions  Musculoskeletal: No arthalgias, myalgias or joint swelling  Eyes: No visual changes or eye pain  Ears: No hearing loss , otorrhea or ear pain  Nose, Mouth, Throat: No nasal congestion, rhinorrhea, oral lesions, postnasal drip or sore throat  Cardio: No chest pain or palpitations. no lower extremity edema. no syncope. no claudication.   Respiratory: No cough, shortness of breath or wheezing   GI:+ abdominal pain   : No urinary frequency, hematuria, incontinence, or dysuria  Neurologic: No headaches, parasthesias, confusion, dysarthria or gait instability  Psychiatric:  No anxiety or depression  Lymphatic:  No easy bruising, easy bleeding or swollen glands  Allergic: No itching, sneezing , watery eyes, clear rhinorrhea or recurrent infections    PEx  T(C): 36.6 (05-12-25 @ 12:20), Max: 36.8 (05-12-25 @ 07:07)  HR: 69 (05-12-25 @ 12:20) (62 - 81)  BP: 124/69 (05-12-25 @ 12:20) (118/91 - 139/92)  RR: 18 (05-12-25 @ 12:20) (18 - 18)  SpO2: 97% (05-12-25 @ 12:20) (97% - 100%)  Wt(kg): --  General:     no acute distress, no identifying marks , scars, or tattoos.  Skin: no rash or prominent lesions  Head: normocephalic, atraumatic     Sinuses: non-tender  Nose: no external lesions, mucosa non-inflamed, septum and turbinates normal  Throat: no erythema, exudates or lesions.  Neck: Supple without lymphadenopathy. Thyroid no thyromegaly, no palpable thyroid nodules, no palpable nodules or masses, carotid arteries no bruits.   Heart: RRR, no murmur or gallop.  Normal S1, S2.  No S3, S4.   Lungs: CTA bilaterally, no wheezes, rhonchi, rales.  Breathing unlabored.   Chest wall: Normal insp   Abdomen:  Soft, tender in epigastric region   Back: spine normal without deformity or tenderness.  Normal ROM   Extremities: No deformities, clubbing, cyanosis, or edema.  Musculoskeletal: Normal gait and station. No decreased range of motion, instability, atrophy or abnormal strength or tone in the head, neck, spine, ribs, pelvis or extremities.   Neurologic: CN 2-12 normal. Sensation to pain, touch and proprioception normal. DTRs normal in upper and lower extremities. No pathologic reflexes.  Motor normal.  Psychiatric: Oriented X3, intact recent and remote memory, judgement and insight, normal mood and affect.                          11.9   4.34  )-----------( 177      ( 12 May 2025 05:02 )             36.3     05-12    139  |  107  |  14  ----------------------------<  122[H]  4.0   |  26  |  0.92    Ca    9.0      12 May 2025 05:02    TPro  6.6  /  Alb  3.7  /  TBili  0.4  /  DBili  x   /  AST  25  /  ALT  25  /  AlkPhos  30[L]  05-12    CAPILLARY BLOOD GLUCOSE          Urinalysis Basic - ( 12 May 2025 07:45 )    Color: Dark Yellow / Appearance: Clear / SG: >1.030 / pH: x  Gluc: x / Ketone: x  / Bili: Negative / Urobili: 0.2 mg/dL   Blood: x / Protein: Negative mg/dL / Nitrite: Negative   Leuk Esterase: Negative / RBC: 26 /HPF / WBC 3 /HPF   Sq Epi: x / Non Sq Epi: 3 /HPF / Bacteria: Occasional /HPF          Radiology/Imaging, I have personally reviewed:  CTAP with iv contrast   IMPRESSION:  There are several mildly dilated mid small bowel loops in a subtle radial   configuration with mild mesenteric edema. There are 2 questionable   transition points. Differential includes early closed loop small bowel   obstruction versus enteritis. Surgical consultation is recommended.   Consider repeat CT with oral contrast.    ct ap with oral contrast    IMPRESSION:  *  Dilatation of small bowel loops, overall decreased in caliber compared   to prior examination performed earlier the same day.  *  Oral contrast has not yet reached the region of possible transition   points. Consider follow-up imaging to assess for passage of contrast.    Assessment and Plan:

## 2025-05-12 NOTE — ED STATDOCS - PHYSICAL EXAMINATION
Const: Well appearing, NAD  Eyes: PERRL, EOM intact  CV: RRR, no murmurs, no chest wall tenderness, distal pulses intact  Resp: CTAB, normal resp effort  GI: RUQ and epigastric tednerness. soft, nondistended.. No CVA tenderness  MSK: Full ROM, no muscle or bony deformity or tenderness  Neuro: AOx3, GCS 15, No focal deficits  Skin: No rash, laceration or abrasion  Psych: calm, cooperative.

## 2025-05-12 NOTE — CONSULT NOTE ADULT - NS ATTEND AMEND GEN_ALL_CORE FT
34 yo fem migraines, neuropathy who developed upper abd pain, emesis, last BM yest. CT suggest of SBO. PO contrast hasn't reached colon yet  PMH migraines, abd pain  PSH: none  Abd soft, NT  34 yo fem abd pain, abd migraines?  -Will repaet AXR later to look at PO contrast reaching colon  -liquid diet  -Migraine treatment

## 2025-05-13 ENCOUNTER — TRANSCRIPTION ENCOUNTER (OUTPATIENT)
Age: 35
End: 2025-05-13

## 2025-05-13 VITALS
HEART RATE: 58 BPM | TEMPERATURE: 99 F | OXYGEN SATURATION: 99 % | SYSTOLIC BLOOD PRESSURE: 115 MMHG | RESPIRATION RATE: 18 BRPM | DIASTOLIC BLOOD PRESSURE: 73 MMHG

## 2025-05-13 LAB
A1C WITH ESTIMATED AVERAGE GLUCOSE RESULT: 4.9 % — SIGNIFICANT CHANGE UP (ref 4–5.6)
ALBUMIN SERPL ELPH-MCNC: 3.3 G/DL — SIGNIFICANT CHANGE UP (ref 3.3–5)
ALP SERPL-CCNC: 36 U/L — LOW (ref 40–120)
ALT FLD-CCNC: 21 U/L — SIGNIFICANT CHANGE UP (ref 12–78)
ANION GAP SERPL CALC-SCNC: 5 MMOL/L — SIGNIFICANT CHANGE UP (ref 5–17)
AST SERPL-CCNC: 23 U/L — SIGNIFICANT CHANGE UP (ref 15–37)
BILIRUB SERPL-MCNC: 0.7 MG/DL — SIGNIFICANT CHANGE UP (ref 0.2–1.2)
BUN SERPL-MCNC: 12 MG/DL — SIGNIFICANT CHANGE UP (ref 7–23)
CALCIUM SERPL-MCNC: 9 MG/DL — SIGNIFICANT CHANGE UP (ref 8.5–10.1)
CHLORIDE SERPL-SCNC: 105 MMOL/L — SIGNIFICANT CHANGE UP (ref 96–108)
CO2 SERPL-SCNC: 27 MMOL/L — SIGNIFICANT CHANGE UP (ref 22–31)
CREAT SERPL-MCNC: 0.89 MG/DL — SIGNIFICANT CHANGE UP (ref 0.5–1.3)
EGFR: 88 ML/MIN/1.73M2 — SIGNIFICANT CHANGE UP
EGFR: 88 ML/MIN/1.73M2 — SIGNIFICANT CHANGE UP
ESTIMATED AVERAGE GLUCOSE: 94 MG/DL — SIGNIFICANT CHANGE UP (ref 68–114)
GLUCOSE BLDC GLUCOMTR-MCNC: 86 MG/DL — SIGNIFICANT CHANGE UP (ref 70–99)
GLUCOSE BLDC GLUCOMTR-MCNC: 86 MG/DL — SIGNIFICANT CHANGE UP (ref 70–99)
GLUCOSE SERPL-MCNC: 81 MG/DL — SIGNIFICANT CHANGE UP (ref 70–99)
HCT VFR BLD CALC: 35.6 % — SIGNIFICANT CHANGE UP (ref 34.5–45)
HGB BLD-MCNC: 11.7 G/DL — SIGNIFICANT CHANGE UP (ref 11.5–15.5)
MCHC RBC-ENTMCNC: 29.5 PG — SIGNIFICANT CHANGE UP (ref 27–34)
MCHC RBC-ENTMCNC: 32.9 G/DL — SIGNIFICANT CHANGE UP (ref 32–36)
MCV RBC AUTO: 89.7 FL — SIGNIFICANT CHANGE UP (ref 80–100)
NRBC # BLD AUTO: 0 K/UL — SIGNIFICANT CHANGE UP (ref 0–0)
NRBC # FLD: 0 K/UL — SIGNIFICANT CHANGE UP (ref 0–0)
NRBC BLD AUTO-RTO: 0 /100 WBCS — SIGNIFICANT CHANGE UP (ref 0–0)
PLATELET # BLD AUTO: 173 K/UL — SIGNIFICANT CHANGE UP (ref 150–400)
PMV BLD: 9.9 FL — SIGNIFICANT CHANGE UP (ref 7–13)
POTASSIUM SERPL-MCNC: 4.2 MMOL/L — SIGNIFICANT CHANGE UP (ref 3.5–5.3)
POTASSIUM SERPL-SCNC: 4.2 MMOL/L — SIGNIFICANT CHANGE UP (ref 3.5–5.3)
PROT SERPL-MCNC: 6.7 GM/DL — SIGNIFICANT CHANGE UP (ref 6–8.3)
RBC # BLD: 3.97 M/UL — SIGNIFICANT CHANGE UP (ref 3.8–5.2)
RBC # FLD: 12.2 % — SIGNIFICANT CHANGE UP (ref 10.3–14.5)
SODIUM SERPL-SCNC: 137 MMOL/L — SIGNIFICANT CHANGE UP (ref 135–145)
WBC # BLD: 4.44 K/UL — SIGNIFICANT CHANGE UP (ref 3.8–10.5)
WBC # FLD AUTO: 4.44 K/UL — SIGNIFICANT CHANGE UP (ref 3.8–10.5)

## 2025-05-13 PROCEDURE — 99239 HOSP IP/OBS DSCHRG MGMT >30: CPT

## 2025-05-13 PROCEDURE — 74018 RADEX ABDOMEN 1 VIEW: CPT | Mod: 26

## 2025-05-13 RX ADMIN — Medication 1 MILLIGRAM(S): at 00:50

## 2025-05-13 RX ADMIN — Medication 1 MILLIGRAM(S): at 01:20

## 2025-05-13 RX ADMIN — Medication 10 MILLIGRAM(S): at 01:11

## 2025-05-13 RX ADMIN — SODIUM CHLORIDE 70 MILLILITER(S): 9 INJECTION, SOLUTION INTRAVENOUS at 07:17

## 2025-05-13 NOTE — DISCHARGE NOTE NURSING/CASE MANAGEMENT/SOCIAL WORK - PATIENT PORTAL LINK FT
You can access the FollowMyHealth Patient Portal offered by [facility] by registering at the following website: [email or web address]. By joining [facility]’s FollowMyHealth portal, you will also be able to view your health information using other applications (apps) compatible with our system.

## 2025-05-13 NOTE — DISCHARGE NOTE PROVIDER - CARE PROVIDER_API CALL
Rob Maldonado  Sports Medicine  [address], [address]  [place] [address]  Phone: [phone]  Fax: [phone]  Follow Up Time:     Shaye Gonzalez  Obstetrics and Gynecology  [address], [address]  [place] [address]  Phone: [phone]  Fax: [phone]  Follow Up Time:

## 2025-05-13 NOTE — DISCHARGE NOTE NURSING/CASE MANAGEMENT/SOCIAL WORK - NSDCPETBCESMAN_GEN_ALL_CORE
If you are a smoker, it is important for your health to stop smoking. Please be aware that second hand smoke is also harmful.
160

## 2025-05-13 NOTE — DISCHARGE NOTE PROVIDER - HOSPITAL COURSE
#Discharge: do not delete    35-year-old female with myofascial pain, small fiber neuropathy, migraines presents to the ER complaining of abdominal pain.  Began about 1 hour prior to arrival.  States that the pain is 8 out of 10 in the epigastric region radiating down to and radiating down towards the bellybutton.  Denies nausea, diarrhea, vaginal bleeding, dysuria, hematuria. Reports one episode of emesis at home and constipation. CT showing concern for possible sbo pt admitted for further management. Seen by surgery - tolerating PO now passing flatus and bowel movements -okay for dc per surgical standpoint - diagnosed with abdominal migrane.   Problem List/Main Diagnoses (system-based):     #SBO?   #Abdominal pain   - CTAP w/ IV contast showing 2 questionable transition points   - CTAP w/ oral contrast Dilatation of small bowel loops, overall decreased in caliber compared   to prior examination performed earlier the same day w/ oral contrast that has not yet reached transition points  - XR serially done -surg eval okay for dc  - GI consulted /f/u outpatient for ?abdominal migrane    #Myofascial pain  #Mygraines  - cw home birth control    - cw home qulipta   - cw home tizanidine on dc held here when concern for sbo   - cw home gabapentin   - cw home duloxetine     Inpatient treatment course: as above  New medications: none

## 2025-05-13 NOTE — DISCHARGE NOTE PROVIDER - NSDCCPCAREPLAN_GEN_ALL_CORE_FT
PRINCIPAL DISCHARGE DIAGNOSIS  Diagnosis: Abdominal pain  Assessment and Plan of Treatment: Abdominal migraine is a type of migraine where the primary pain is located in the abdomen, rather than the head. It's characterized by episodes of intense abdominal pain, often accompanied by nausea, vomiting, and other migraine-like symptoms like sensitivity to light and sound.

## 2025-05-13 NOTE — PROGRESS NOTE ADULT - SUBJECTIVE AND OBJECTIVE BOX
34 yo fem with improved abd pain, abd migraine? PO contrast in large bowel this morning    Abd soft, NT, ND          05-13-25 @ 08:43    Vital Signs Last 24 Hrs  T(C): 37.1 (13 May 2025 08:13), Max: 37.1 (13 May 2025 08:13)  T(F): 98.7 (13 May 2025 08:13), Max: 98.7 (13 May 2025 08:13)  HR: 58 (13 May 2025 08:13) (58 - 80)  BP: 115/73 (13 May 2025 08:13) (110/69 - 141/78)  BP(mean): 83 (13 May 2025 08:13) (79 - 93)  RR: 18 (13 May 2025 08:13) (18 - 20)  SpO2: 99% (13 May 2025 08:13) (95% - 99%)    Parameters below as of 13 May 2025 08:13  Patient On (Oxygen Delivery Method): room air                              11.7   4.44  )-----------( 173      ( 13 May 2025 07:51 )             35.6       05-13    137  |  105  |  12  ----------------------------<  81  4.2   |  27  |  0.89    Ca    9.0      13 May 2025 07:51    TPro  6.7  /  Alb  3.3  /  TBili  0.7  /  DBili  x   /  AST  23  /  ALT  21  /  AlkPhos  36[L]  05-13      LIVER FUNCTIONS - ( 13 May 2025 07:51 )  Alb: 3.3 g/dL / Pro: 6.7 gm/dL / ALK PHOS: 36 U/L / ALT: 21 U/L / AST: 23 U/L / GGT: x             MEDICATIONS  (STANDING):  cetirizine 10 milliGRAM(s) Oral daily  dextrose 5%. 1000 milliLiter(s) (50 mL/Hr) IV Continuous <Continuous>  dextrose 5%. 1000 milliLiter(s) (100 mL/Hr) IV Continuous <Continuous>  dextrose 50% Injectable 25 Gram(s) IV Push once  dextrose 50% Injectable 12.5 Gram(s) IV Push once  dextrose 50% Injectable 25 Gram(s) IV Push once  Drospirenon-Ethinyl Estradiol 3mG-0.02mG 1 Tablet(s) 1 Tablet(s) Oral daily  DULoxetine 40 milliGRAM(s) Oral daily  gabapentin 100 milliGRAM(s) Oral daily  glucagon  Injectable 1 milliGRAM(s) IntraMuscular once  metoclopramide Injectable 5 milliGRAM(s) IV Push four times a day  Qulipta 60 milliGRAM(s) 60 milliGRAM(s) Oral daily    MEDICATIONS  (PRN):  acetaminophen     Tablet .. 650 milliGRAM(s) Oral every 6 hours PRN Temp greater or equal to 38C (100.4F), Mild Pain (1 - 3)  aluminum hydroxide/magnesium hydroxide/simethicone Suspension 30 milliLiter(s) Oral every 4 hours PRN Dyspepsia  dextrose Oral Gel 15 Gram(s) Oral once PRN Blood Glucose LESS THAN 70 milliGRAM(s)/deciliter  HYDROmorphone  Injectable 0.5 milliGRAM(s) IV Push every 4 hours PRN Moderate Pain (4 - 6)  HYDROmorphone  Injectable 1 milliGRAM(s) IV Push every 4 hours PRN Severe Pain (7 - 10)  melatonin 3 milliGRAM(s) Oral at bedtime PRN Insomnia  ondansetron Injectable 4 milliGRAM(s) IV Push every 8 hours PRN Nausea and/or Vomiting      MEDICATIONS  (STANDING):  cetirizine 10 milliGRAM(s) Oral daily  dextrose 5%. 1000 milliLiter(s) (50 mL/Hr) IV Continuous <Continuous>  dextrose 5%. 1000 milliLiter(s) (100 mL/Hr) IV Continuous <Continuous>  dextrose 50% Injectable 25 Gram(s) IV Push once  dextrose 50% Injectable 12.5 Gram(s) IV Push once  dextrose 50% Injectable 25 Gram(s) IV Push once  Drospirenon-Ethinyl Estradiol 3mG-0.02mG 1 Tablet(s) 1 Tablet(s) Oral daily  DULoxetine 40 milliGRAM(s) Oral daily  gabapentin 100 milliGRAM(s) Oral daily  glucagon  Injectable 1 milliGRAM(s) IntraMuscular once  metoclopramide Injectable 5 milliGRAM(s) IV Push four times a day  Qulipta 60 milliGRAM(s) 60 milliGRAM(s) Oral daily

## 2025-05-13 NOTE — DISCHARGE NOTE PROVIDER - NSDCMRMEDTOKEN_GEN_ALL_CORE_FT
drospirenone-ethinyl estradiol 3 mg-0.02 mg oral tablet: 1 tab(s) orally once a day ***pts family will bring***  DULoxetine 40 mg oral delayed release capsule: 1 cap(s) orally once a day ***Dose was just increased from 30mg***  gabapentin 100 mg oral capsule: 1 cap(s) orally once a day  Qulipta 60 mg oral tablet: 1 tab(s) orally once a day ***pts family will bring own***  rizatriptan 5 mg oral tablet: 1 tab(s) orally as needed for headache.  May repeat in 2 hours if needed  tiZANidine 4 mg oral tablet: 0.5 tab(s) orally once a day (at bedtime)  ZyrTEC 10 mg oral tablet: 1 tab(s) orally once a day

## 2025-05-13 NOTE — DISCHARGE NOTE NURSING/CASE MANAGEMENT/SOCIAL WORK - FINANCIAL ASSISTANCE
[facility] provides services at a reduced cost to those who are determined to be eligible through [facility]’s financial assistance program. Information regarding [facility]’s financial assistance program can be found by going to [email or web address] or by calling [phone].

## 2025-05-16 ENCOUNTER — TRANSCRIPTION ENCOUNTER (OUTPATIENT)
Age: 35
End: 2025-05-16

## 2025-05-23 DIAGNOSIS — G43.D0 ABDOMINAL MIGRAINE, NOT INTRACTABLE: ICD-10-CM

## 2025-05-23 DIAGNOSIS — M79.18 MYALGIA, OTHER SITE: ICD-10-CM

## 2025-05-23 DIAGNOSIS — Z88.2 ALLERGY STATUS TO SULFONAMIDES: ICD-10-CM

## 2025-07-16 ENCOUNTER — APPOINTMENT (OUTPATIENT)
Dept: OBGYN | Facility: CLINIC | Age: 35
End: 2025-07-16
Payer: COMMERCIAL

## 2025-07-16 VITALS
HEIGHT: 66 IN | DIASTOLIC BLOOD PRESSURE: 74 MMHG | SYSTOLIC BLOOD PRESSURE: 112 MMHG | WEIGHT: 178 LBS | BODY MASS INDEX: 28.61 KG/M2

## 2025-07-16 PROCEDURE — 99385 PREV VISIT NEW AGE 18-39: CPT

## 2025-07-16 RX ORDER — DROSPIRENONE AND ETHINYL ESTRADIOL 0.02-3(28)
3-0.02 KIT ORAL DAILY
Qty: 3 | Refills: 1 | Status: ACTIVE | COMMUNITY
Start: 2025-07-16 | End: 1900-01-01

## 2025-07-18 LAB — HPV HIGH+LOW RISK DNA PNL CVX: NOT DETECTED

## 2025-07-20 LAB — CYTOLOGY CVX/VAG DOC THIN PREP: NORMAL
